# Patient Record
Sex: FEMALE | Race: WHITE | NOT HISPANIC OR LATINO | URBAN - METROPOLITAN AREA
[De-identification: names, ages, dates, MRNs, and addresses within clinical notes are randomized per-mention and may not be internally consistent; named-entity substitution may affect disease eponyms.]

---

## 2017-04-10 ENCOUNTER — EMERGENCY (EMERGENCY)
Facility: HOSPITAL | Age: 32
LOS: 1 days | Discharge: HOME | End: 2017-04-10
Attending: EMERGENCY MEDICINE | Admitting: EMERGENCY MEDICINE
Payer: COMMERCIAL

## 2017-04-10 VITALS
DIASTOLIC BLOOD PRESSURE: 86 MMHG | HEIGHT: 64 IN | WEIGHT: 130.07 LBS | RESPIRATION RATE: 16 BRPM | OXYGEN SATURATION: 100 % | SYSTOLIC BLOOD PRESSURE: 135 MMHG | TEMPERATURE: 98 F | HEART RATE: 90 BPM

## 2017-04-10 DIAGNOSIS — G40.909 EPILEPSY, UNSPECIFIED, NOT INTRACTABLE, WITHOUT STATUS EPILEPTICUS: ICD-10-CM

## 2017-04-10 DIAGNOSIS — R10.30 LOWER ABDOMINAL PAIN, UNSPECIFIED: ICD-10-CM

## 2017-04-10 DIAGNOSIS — Z91.040 LATEX ALLERGY STATUS: ICD-10-CM

## 2017-04-10 DIAGNOSIS — N83.201 UNSPECIFIED OVARIAN CYST, RIGHT SIDE: ICD-10-CM

## 2017-04-10 LAB
ALBUMIN SERPL ELPH-MCNC: 4.5 G/DL — SIGNIFICANT CHANGE UP (ref 3.3–5)
ALP SERPL-CCNC: 91 U/L — SIGNIFICANT CHANGE UP (ref 40–120)
ALT FLD-CCNC: 29 U/L — SIGNIFICANT CHANGE UP (ref 12–78)
ANION GAP SERPL CALC-SCNC: 9 MMOL/L — SIGNIFICANT CHANGE UP (ref 5–17)
APPEARANCE UR: CLEAR — SIGNIFICANT CHANGE UP
AST SERPL-CCNC: 20 U/L — SIGNIFICANT CHANGE UP (ref 15–37)
BASOPHILS # BLD AUTO: 0.1 K/UL — SIGNIFICANT CHANGE UP (ref 0–0.2)
BASOPHILS NFR BLD AUTO: 1.1 % — SIGNIFICANT CHANGE UP (ref 0–2)
BILIRUB SERPL-MCNC: 0.2 MG/DL — SIGNIFICANT CHANGE UP (ref 0.2–1.2)
BILIRUB UR-MCNC: NEGATIVE — SIGNIFICANT CHANGE UP
BUN SERPL-MCNC: 22 MG/DL — SIGNIFICANT CHANGE UP (ref 7–23)
CALCIUM SERPL-MCNC: 9 MG/DL — SIGNIFICANT CHANGE UP (ref 8.5–10.1)
CHLORIDE SERPL-SCNC: 107 MMOL/L — SIGNIFICANT CHANGE UP (ref 96–108)
CO2 SERPL-SCNC: 22 MMOL/L — SIGNIFICANT CHANGE UP (ref 22–31)
COLOR SPEC: YELLOW — SIGNIFICANT CHANGE UP
CREAT SERPL-MCNC: 0.76 MG/DL — SIGNIFICANT CHANGE UP (ref 0.5–1.3)
DIFF PNL FLD: NEGATIVE — SIGNIFICANT CHANGE UP
EOSINOPHIL # BLD AUTO: 0.2 K/UL — SIGNIFICANT CHANGE UP (ref 0–0.5)
EOSINOPHIL NFR BLD AUTO: 2 % — SIGNIFICANT CHANGE UP (ref 0–6)
GLUCOSE SERPL-MCNC: 92 MG/DL — SIGNIFICANT CHANGE UP (ref 70–99)
GLUCOSE UR QL: NEGATIVE — SIGNIFICANT CHANGE UP
HCG SERPL-ACNC: <1 MIU/ML — SIGNIFICANT CHANGE UP
HCT VFR BLD CALC: 39.4 % — SIGNIFICANT CHANGE UP (ref 34.5–45)
HGB BLD-MCNC: 12.6 G/DL — SIGNIFICANT CHANGE UP (ref 11.5–15.5)
KETONES UR-MCNC: NEGATIVE — SIGNIFICANT CHANGE UP
LEUKOCYTE ESTERASE UR-ACNC: NEGATIVE — SIGNIFICANT CHANGE UP
LIDOCAIN IGE QN: 161 U/L — SIGNIFICANT CHANGE UP (ref 73–393)
LYMPHOCYTES # BLD AUTO: 2.6 K/UL — SIGNIFICANT CHANGE UP (ref 1–3.3)
LYMPHOCYTES # BLD AUTO: 33.7 % — SIGNIFICANT CHANGE UP (ref 13–44)
MCHC RBC-ENTMCNC: 28.7 PG — SIGNIFICANT CHANGE UP (ref 27–34)
MCHC RBC-ENTMCNC: 32 GM/DL — SIGNIFICANT CHANGE UP (ref 32–36)
MCV RBC AUTO: 89.6 FL — SIGNIFICANT CHANGE UP (ref 80–100)
MONOCYTES # BLD AUTO: 0.6 K/UL — SIGNIFICANT CHANGE UP (ref 0–0.9)
MONOCYTES NFR BLD AUTO: 7.5 % — SIGNIFICANT CHANGE UP (ref 1–9)
NEUTROPHILS # BLD AUTO: 4.3 K/UL — SIGNIFICANT CHANGE UP (ref 1.8–7.4)
NEUTROPHILS NFR BLD AUTO: 55.7 % — SIGNIFICANT CHANGE UP (ref 43–77)
NITRITE UR-MCNC: NEGATIVE — SIGNIFICANT CHANGE UP
PH UR: 6.5 — SIGNIFICANT CHANGE UP (ref 4.8–8)
PLATELET # BLD AUTO: 225 K/UL — SIGNIFICANT CHANGE UP (ref 150–400)
POTASSIUM SERPL-MCNC: 3.8 MMOL/L — SIGNIFICANT CHANGE UP (ref 3.5–5.3)
POTASSIUM SERPL-SCNC: 3.8 MMOL/L — SIGNIFICANT CHANGE UP (ref 3.5–5.3)
PROT SERPL-MCNC: 7.7 G/DL — SIGNIFICANT CHANGE UP (ref 6–8.3)
PROT UR-MCNC: NEGATIVE — SIGNIFICANT CHANGE UP
RBC # BLD: 4.39 M/UL — SIGNIFICANT CHANGE UP (ref 3.8–5.2)
RBC # FLD: 12.7 % — SIGNIFICANT CHANGE UP (ref 10.3–14.5)
SODIUM SERPL-SCNC: 138 MMOL/L — SIGNIFICANT CHANGE UP (ref 135–145)
SP GR SPEC: 1 — LOW (ref 1.01–1.02)
UROBILINOGEN FLD QL: NEGATIVE — SIGNIFICANT CHANGE UP
WBC # BLD: 7.7 K/UL — SIGNIFICANT CHANGE UP (ref 3.8–10.5)
WBC # FLD AUTO: 7.7 K/UL — SIGNIFICANT CHANGE UP (ref 3.8–10.5)

## 2017-04-10 PROCEDURE — 99284 EMERGENCY DEPT VISIT MOD MDM: CPT | Mod: 25

## 2017-04-10 PROCEDURE — 76830 TRANSVAGINAL US NON-OB: CPT

## 2017-04-10 PROCEDURE — 81003 URINALYSIS AUTO W/O SCOPE: CPT

## 2017-04-10 PROCEDURE — 80053 COMPREHEN METABOLIC PANEL: CPT

## 2017-04-10 PROCEDURE — 84702 CHORIONIC GONADOTROPIN TEST: CPT

## 2017-04-10 PROCEDURE — 99285 EMERGENCY DEPT VISIT HI MDM: CPT

## 2017-04-10 PROCEDURE — 76830 TRANSVAGINAL US NON-OB: CPT | Mod: 26

## 2017-04-10 PROCEDURE — 83690 ASSAY OF LIPASE: CPT

## 2017-04-10 PROCEDURE — 85027 COMPLETE CBC AUTOMATED: CPT

## 2017-04-10 RX ORDER — SODIUM CHLORIDE 9 MG/ML
1000 INJECTION INTRAMUSCULAR; INTRAVENOUS; SUBCUTANEOUS
Qty: 0 | Refills: 0 | Status: DISCONTINUED | OUTPATIENT
Start: 2017-04-10 | End: 2017-04-14

## 2017-04-10 RX ORDER — IOHEXOL 300 MG/ML
30 INJECTION, SOLUTION INTRAVENOUS ONCE
Qty: 0 | Refills: 0 | Status: COMPLETED | OUTPATIENT
Start: 2017-04-10 | End: 2017-04-10

## 2017-04-10 RX ADMIN — IOHEXOL 30 MILLILITER(S): 300 INJECTION, SOLUTION INTRAVENOUS at 21:30

## 2017-04-10 RX ADMIN — SODIUM CHLORIDE 2000 MILLILITER(S): 9 INJECTION INTRAMUSCULAR; INTRAVENOUS; SUBCUTANEOUS at 21:30

## 2017-04-10 NOTE — ED PROVIDER NOTE - MEDICAL DECISION MAKING DETAILS
pt is 30 yo female with no sign hx. pt with 2 days lower abd pain, check labs, preg, sono, ct if neg.

## 2017-04-10 NOTE — ED PROVIDER NOTE - CHPI ED SYMPTOMS NEG
no abdominal distension/no burning urination/no vomiting/no chills/no dysuria/no fever/no diarrhea/no nausea

## 2017-04-10 NOTE — ED ADULT TRIAGE NOTE - CHIEF COMPLAINT QUOTE
patient c/o b/l lower abdominal pain since Saturday. patient denies fever, chills, nausea, vomiting, diarrhea, hematuria, dysuria.

## 2017-04-10 NOTE — ED PROVIDER NOTE - OBJECTIVE STATEMENT
Pt is a 33 yo female flu one week ago. pt took tamiflu. pt now with 2 days lower abd pain, left more than right, no f/c, n/v/d. pt denies d/c radiation or other sx. no vag d/c or bleeding. pain increased on bumps on car ride to er.

## 2017-04-11 VITALS
SYSTOLIC BLOOD PRESSURE: 131 MMHG | OXYGEN SATURATION: 100 % | DIASTOLIC BLOOD PRESSURE: 74 MMHG | RESPIRATION RATE: 15 BRPM | TEMPERATURE: 98 F | HEART RATE: 79 BPM

## 2017-07-20 ENCOUNTER — TRANSCRIPTION ENCOUNTER (OUTPATIENT)
Age: 32
End: 2017-07-20

## 2017-09-11 ENCOUNTER — TRANSCRIPTION ENCOUNTER (OUTPATIENT)
Age: 32
End: 2017-09-11

## 2017-09-28 ENCOUNTER — OUTPATIENT (OUTPATIENT)
Dept: OUTPATIENT SERVICES | Facility: HOSPITAL | Age: 32
LOS: 1 days | End: 2017-09-28
Payer: COMMERCIAL

## 2017-09-28 VITALS
DIASTOLIC BLOOD PRESSURE: 80 MMHG | HEART RATE: 76 BPM | OXYGEN SATURATION: 97 % | HEIGHT: 64 IN | TEMPERATURE: 100 F | RESPIRATION RATE: 12 BRPM | SYSTOLIC BLOOD PRESSURE: 114 MMHG | WEIGHT: 130.95 LBS

## 2017-09-28 DIAGNOSIS — N83.202 UNSPECIFIED OVARIAN CYST, LEFT SIDE: ICD-10-CM

## 2017-09-28 DIAGNOSIS — N83.201 UNSPECIFIED OVARIAN CYST, RIGHT SIDE: ICD-10-CM

## 2017-09-28 DIAGNOSIS — Z01.818 ENCOUNTER FOR OTHER PREPROCEDURAL EXAMINATION: ICD-10-CM

## 2017-09-28 PROCEDURE — G0463: CPT

## 2017-09-28 PROCEDURE — 85027 COMPLETE CBC AUTOMATED: CPT

## 2017-09-28 RX ORDER — SODIUM CHLORIDE 9 MG/ML
3 INJECTION INTRAMUSCULAR; INTRAVENOUS; SUBCUTANEOUS EVERY 8 HOURS
Qty: 0 | Refills: 0 | Status: DISCONTINUED | OUTPATIENT
Start: 2017-10-05 | End: 2017-10-20

## 2017-09-28 RX ORDER — LIDOCAINE HCL 20 MG/ML
0.2 VIAL (ML) INJECTION ONCE
Qty: 0 | Refills: 0 | Status: DISCONTINUED | OUTPATIENT
Start: 2017-10-05 | End: 2017-10-20

## 2017-09-28 RX ORDER — ACETAMINOPHEN 500 MG
975 TABLET ORAL ONCE
Qty: 0 | Refills: 0 | Status: COMPLETED | OUTPATIENT
Start: 2017-10-05 | End: 2017-10-05

## 2017-09-28 NOTE — H&P PST ADULT - NSANTHOSAYNRD_GEN_A_CORE
No. RONNI screening performed.  STOP BANG Legend: 0-2 = LOW Risk; 3-4 = INTERMEDIATE Risk; 5-8 = HIGH Risk

## 2017-10-04 ENCOUNTER — TRANSCRIPTION ENCOUNTER (OUTPATIENT)
Age: 32
End: 2017-10-04

## 2017-10-05 ENCOUNTER — OUTPATIENT (OUTPATIENT)
Dept: OUTPATIENT SERVICES | Facility: HOSPITAL | Age: 32
LOS: 1 days | End: 2017-10-05
Payer: COMMERCIAL

## 2017-10-05 ENCOUNTER — RESULT REVIEW (OUTPATIENT)
Age: 32
End: 2017-10-05

## 2017-10-05 VITALS
RESPIRATION RATE: 12 BRPM | DIASTOLIC BLOOD PRESSURE: 82 MMHG | WEIGHT: 130.95 LBS | OXYGEN SATURATION: 100 % | SYSTOLIC BLOOD PRESSURE: 132 MMHG | TEMPERATURE: 98 F | HEART RATE: 78 BPM | HEIGHT: 64 IN

## 2017-10-05 VITALS
OXYGEN SATURATION: 99 % | RESPIRATION RATE: 18 BRPM | SYSTOLIC BLOOD PRESSURE: 119 MMHG | DIASTOLIC BLOOD PRESSURE: 67 MMHG | HEART RATE: 87 BPM

## 2017-10-05 DIAGNOSIS — N83.201 UNSPECIFIED OVARIAN CYST, RIGHT SIDE: ICD-10-CM

## 2017-10-05 PROCEDURE — C1889: CPT

## 2017-10-05 PROCEDURE — 88305 TISSUE EXAM BY PATHOLOGIST: CPT

## 2017-10-05 PROCEDURE — 88305 TISSUE EXAM BY PATHOLOGIST: CPT | Mod: 26

## 2017-10-05 PROCEDURE — 58662 LAPAROSCOPY EXCISE LESIONS: CPT

## 2017-10-05 RX ORDER — ONDANSETRON 8 MG/1
4 TABLET, FILM COATED ORAL ONCE
Qty: 0 | Refills: 0 | Status: COMPLETED | OUTPATIENT
Start: 2017-10-05 | End: 2017-10-05

## 2017-10-05 RX ORDER — DIPHENHYDRAMINE HCL 50 MG
25 CAPSULE ORAL ONCE
Qty: 0 | Refills: 0 | Status: COMPLETED | OUTPATIENT
Start: 2017-10-05 | End: 2017-10-05

## 2017-10-05 RX ORDER — CELECOXIB 200 MG/1
200 CAPSULE ORAL ONCE
Qty: 0 | Refills: 0 | Status: COMPLETED | OUTPATIENT
Start: 2017-10-05 | End: 2017-10-05

## 2017-10-05 RX ORDER — SODIUM CHLORIDE 9 MG/ML
1000 INJECTION, SOLUTION INTRAVENOUS
Qty: 0 | Refills: 0 | Status: DISCONTINUED | OUTPATIENT
Start: 2017-10-05 | End: 2017-10-20

## 2017-10-05 RX ORDER — OXYCODONE HYDROCHLORIDE 5 MG/1
10 TABLET ORAL ONCE
Qty: 0 | Refills: 0 | Status: DISCONTINUED | OUTPATIENT
Start: 2017-10-05 | End: 2017-10-05

## 2017-10-05 RX ORDER — OXYCODONE HYDROCHLORIDE 5 MG/1
1 TABLET ORAL
Qty: 20 | Refills: 0
Start: 2017-10-05

## 2017-10-05 RX ORDER — DEXAMETHASONE 0.5 MG/5ML
4 ELIXIR ORAL ONCE
Qty: 0 | Refills: 0 | Status: COMPLETED | OUTPATIENT
Start: 2017-10-05 | End: 2017-10-05

## 2017-10-05 RX ORDER — HYDROMORPHONE HYDROCHLORIDE 2 MG/ML
0.25 INJECTION INTRAMUSCULAR; INTRAVENOUS; SUBCUTANEOUS
Qty: 0 | Refills: 0 | Status: DISCONTINUED | OUTPATIENT
Start: 2017-10-05 | End: 2017-10-05

## 2017-10-05 RX ADMIN — CELECOXIB 200 MILLIGRAM(S): 200 CAPSULE ORAL at 07:43

## 2017-10-05 RX ADMIN — OXYCODONE HYDROCHLORIDE 10 MILLIGRAM(S): 5 TABLET ORAL at 10:22

## 2017-10-05 RX ADMIN — CELECOXIB 200 MILLIGRAM(S): 200 CAPSULE ORAL at 10:22

## 2017-10-05 RX ADMIN — Medication 975 MILLIGRAM(S): at 07:43

## 2017-10-05 RX ADMIN — HYDROMORPHONE HYDROCHLORIDE 0.25 MILLIGRAM(S): 2 INJECTION INTRAMUSCULAR; INTRAVENOUS; SUBCUTANEOUS at 10:22

## 2017-10-05 RX ADMIN — Medication 4 MILLIGRAM(S): at 11:45

## 2017-10-05 RX ADMIN — ONDANSETRON 4 MILLIGRAM(S): 8 TABLET, FILM COATED ORAL at 10:24

## 2017-10-05 RX ADMIN — Medication 25 MILLIGRAM(S): at 11:45

## 2017-10-05 NOTE — ASU DISCHARGE PLAN (ADULT/PEDIATRIC). - NOTIFY
Excessive Diarrhea/Swelling that continues/GYN Fever>100.4/Numbness, tingling/Inability to Tolerate Liquids or Foods/Numbness, color, or temperature change to extremity/Unable to Urinate/Bleeding that does not stop/Pain not relieved by Medications/Persistent Nausea and Vomiting/Increased Irritability or Sluggishness

## 2017-10-05 NOTE — ASU DISCHARGE PLAN (ADULT/PEDIATRIC). - MEDICATION SUMMARY - MEDICATIONS TO TAKE
I will START or STAY ON the medications listed below when I get home from the hospital:    oxyCODONE 5 mg oral tablet  -- 1 tab(s) by mouth every 6 hours, As Needed MDD:4 tabs  -- Caution federal law prohibits the transfer of this drug to any person other  than the person for whom it was prescribed.  It is very important that you take or use this exactly as directed.  Do not skip doses or discontinue unless directed by your doctor.  May cause drowsiness.  Alcohol may intensify this effect.  Use care when operating dangerous machinery.  This prescription cannot be refilled.  Using more of this medication than prescribed may cause serious breathing problems.    -- Indication: For pain    Motrin 600 mg oral tablet  -- 1 tab(s) by mouth every 6 hours  -- Indication: For home med    famotidine 20 mg oral tablet  -- orally once a day pm and am of [place]  -- Indication: For home med

## 2017-10-10 LAB — SURGICAL PATHOLOGY STUDY: SIGNIFICANT CHANGE UP

## 2018-05-17 ENCOUNTER — RESULT REVIEW (OUTPATIENT)
Age: 33
End: 2018-05-17

## 2018-11-26 ENCOUNTER — TRANSCRIPTION ENCOUNTER (OUTPATIENT)
Age: 33
End: 2018-11-26

## 2018-12-22 ENCOUNTER — TRANSCRIPTION ENCOUNTER (OUTPATIENT)
Age: 33
End: 2018-12-22

## 2018-12-25 ENCOUNTER — TRANSCRIPTION ENCOUNTER (OUTPATIENT)
Age: 33
End: 2018-12-25

## 2019-03-01 PROBLEM — Z00.00 ENCOUNTER FOR PREVENTIVE HEALTH EXAMINATION: Status: ACTIVE | Noted: 2019-03-01

## 2019-03-21 ENCOUNTER — APPOINTMENT (OUTPATIENT)
Dept: HUMAN REPRODUCTION | Facility: CLINIC | Age: 34
End: 2019-03-21

## 2020-05-26 ENCOUNTER — TRANSCRIPTION ENCOUNTER (OUTPATIENT)
Age: 35
End: 2020-05-26

## 2020-07-24 ENCOUNTER — RESULT REVIEW (OUTPATIENT)
Age: 35
End: 2020-07-24

## 2020-08-06 ENCOUNTER — TRANSCRIPTION ENCOUNTER (OUTPATIENT)
Age: 35
End: 2020-08-06

## 2020-09-10 ENCOUNTER — APPOINTMENT (OUTPATIENT)
Dept: ANTEPARTUM | Facility: CLINIC | Age: 35
End: 2020-09-10
Payer: COMMERCIAL

## 2020-09-10 ENCOUNTER — ASOB RESULT (OUTPATIENT)
Age: 35
End: 2020-09-10

## 2020-09-10 ENCOUNTER — TRANSCRIPTION ENCOUNTER (OUTPATIENT)
Age: 35
End: 2020-09-10

## 2020-09-10 PROCEDURE — 76805 OB US >/= 14 WKS SNGL FETUS: CPT

## 2020-09-10 PROCEDURE — 99201 OFFICE OUTPATIENT NEW 10 MINUTES: CPT | Mod: 25

## 2020-10-09 ENCOUNTER — APPOINTMENT (OUTPATIENT)
Dept: ANTEPARTUM | Facility: CLINIC | Age: 35
End: 2020-10-09
Payer: COMMERCIAL

## 2020-10-09 ENCOUNTER — ASOB RESULT (OUTPATIENT)
Age: 35
End: 2020-10-09

## 2020-10-09 PROCEDURE — 76817 TRANSVAGINAL US OBSTETRIC: CPT

## 2020-10-09 PROCEDURE — 99212 OFFICE O/P EST SF 10 MIN: CPT | Mod: 25

## 2020-10-09 PROCEDURE — 76811 OB US DETAILED SNGL FETUS: CPT

## 2020-10-19 ENCOUNTER — APPOINTMENT (OUTPATIENT)
Dept: ANTEPARTUM | Facility: CLINIC | Age: 35
End: 2020-10-19
Payer: COMMERCIAL

## 2020-10-19 ENCOUNTER — ASOB RESULT (OUTPATIENT)
Age: 35
End: 2020-10-19

## 2020-10-19 PROCEDURE — 99072 ADDL SUPL MATRL&STAF TM PHE: CPT

## 2020-10-19 PROCEDURE — 93325 DOPPLER ECHO COLOR FLOW MAPG: CPT

## 2020-10-19 PROCEDURE — 76827 ECHO EXAM OF FETAL HEART: CPT

## 2020-10-19 PROCEDURE — 76825 ECHO EXAM OF FETAL HEART: CPT

## 2020-11-20 ENCOUNTER — ASOB RESULT (OUTPATIENT)
Age: 35
End: 2020-11-20

## 2020-11-20 ENCOUNTER — APPOINTMENT (OUTPATIENT)
Dept: ANTEPARTUM | Facility: CLINIC | Age: 35
End: 2020-11-20
Payer: COMMERCIAL

## 2020-11-20 PROCEDURE — 76816 OB US FOLLOW-UP PER FETUS: CPT

## 2020-12-01 NOTE — PACU DISCHARGE NOTE - NAUSEA/VOMITING:
None Area H Indication Text: Tumors in this location are included in Area H (eyelids, eyebrows, nose, lips, chin, ear, pre-auricular, post-auricular, temple, genitalia, hands, feet, ankles and areola).  Tissue conservation is critical in these anatomic locations.

## 2020-12-04 ENCOUNTER — TRANSCRIPTION ENCOUNTER (OUTPATIENT)
Age: 35
End: 2020-12-04

## 2020-12-20 ENCOUNTER — TRANSCRIPTION ENCOUNTER (OUTPATIENT)
Age: 35
End: 2020-12-20

## 2021-01-13 ENCOUNTER — OUTPATIENT (OUTPATIENT)
Dept: OUTPATIENT SERVICES | Facility: HOSPITAL | Age: 36
LOS: 1 days | End: 2021-01-13
Payer: COMMERCIAL

## 2021-01-13 VITALS
SYSTOLIC BLOOD PRESSURE: 125 MMHG | TEMPERATURE: 99 F | RESPIRATION RATE: 16 BRPM | HEART RATE: 100 BPM | DIASTOLIC BLOOD PRESSURE: 71 MMHG

## 2021-01-13 VITALS — OXYGEN SATURATION: 97 % | HEART RATE: 102 BPM

## 2021-01-13 DIAGNOSIS — Z3A.00 WEEKS OF GESTATION OF PREGNANCY NOT SPECIFIED: ICD-10-CM

## 2021-01-13 DIAGNOSIS — O26.899 OTHER SPECIFIED PREGNANCY RELATED CONDITIONS, UNSPECIFIED TRIMESTER: ICD-10-CM

## 2021-01-13 DIAGNOSIS — Z98.890 OTHER SPECIFIED POSTPROCEDURAL STATES: Chronic | ICD-10-CM

## 2021-01-13 LAB
APPEARANCE UR: CLEAR — SIGNIFICANT CHANGE UP
BILIRUB UR-MCNC: NEGATIVE — SIGNIFICANT CHANGE UP
COLOR SPEC: COLORLESS — SIGNIFICANT CHANGE UP
DIFF PNL FLD: NEGATIVE — SIGNIFICANT CHANGE UP
GLUCOSE UR QL: NEGATIVE — SIGNIFICANT CHANGE UP
KETONES UR-MCNC: NEGATIVE — SIGNIFICANT CHANGE UP
LEUKOCYTE ESTERASE UR-ACNC: NEGATIVE — SIGNIFICANT CHANGE UP
NITRITE UR-MCNC: NEGATIVE — SIGNIFICANT CHANGE UP
PH UR: 7 — SIGNIFICANT CHANGE UP (ref 5–8)
PROT UR-MCNC: NEGATIVE — SIGNIFICANT CHANGE UP
SP GR SPEC: 1.01 — LOW (ref 1.01–1.02)
UROBILINOGEN FLD QL: NEGATIVE — SIGNIFICANT CHANGE UP

## 2021-01-13 PROCEDURE — 81003 URINALYSIS AUTO W/O SCOPE: CPT

## 2021-01-13 PROCEDURE — 59025 FETAL NON-STRESS TEST: CPT

## 2021-01-13 PROCEDURE — G0463: CPT

## 2021-01-13 PROCEDURE — 87086 URINE CULTURE/COLONY COUNT: CPT

## 2021-01-13 RX ORDER — ACETAMINOPHEN 500 MG
650 TABLET ORAL ONCE
Refills: 0 | Status: COMPLETED | OUTPATIENT
Start: 2021-01-13 | End: 2021-01-13

## 2021-01-13 RX ORDER — CYCLOBENZAPRINE HYDROCHLORIDE 10 MG/1
10 TABLET, FILM COATED ORAL ONCE
Refills: 0 | Status: COMPLETED | OUTPATIENT
Start: 2021-01-13 | End: 2021-01-13

## 2021-01-13 RX ADMIN — Medication 650 MILLIGRAM(S): at 18:09

## 2021-01-13 RX ADMIN — CYCLOBENZAPRINE HYDROCHLORIDE 10 MILLIGRAM(S): 10 TABLET, FILM COATED ORAL at 18:09

## 2021-01-13 NOTE — OB PROVIDER TRIAGE NOTE - NSOBPROVIDERNOTE_OBGYN_ALL_OB_FT
34y/o  @ 33w6d GA, presents c/o LLQ cramping and associated lower back pain x1 day. VSS. Cervix closed on exam, irreg contractions. Low suspicion for PTL. Differential includes UTI, musculoskeletal pain, less likely nephrolithiasis.    -Send UA/Ucx  -Repeat SVE in 2 hours  -Continous EFM/[place]     D/w Dr. Lela Ulloa PGY-3 36y/o  @ 33w6d GA, presents c/o LLQ cramping and associated lower back pain x1 day. VSS. Cervix closed on exam, irreg contractions. Low suspicion for PTL. Differential includes UTI, musculoskeletal pain, less likely nephrolithiasis.    -Send UA/Ucx  -Repeat SVE in 2 hours  -Continous EFM/[place]     D/w Dr. Lela Ulloa PGY-3      OB attending  pt reports pain that started on her back and "couldn't move last night" - only called today about back pain that was relieved by tylenol but not completely and now still 5/10 on left side, worse with movement  pain localized to one area  no pain on SVE - L/C/P at 5:30pm as well  mod variability +  qaccels no current decels, rare ctx  unlikely  labor  UA without significant findings  could be muscoskeletal - will try tylenol with flexeril, If pain not improved consider additional imaging

## 2021-01-13 NOTE — CHART NOTE - NSCHARTNOTEFT_GEN_A_CORE
followed up with patient  pain down to 3  feeling better  fetal status remains reassuring  denies ctx  will d/c home and f/u as needed  strict precautions given for ptl or  sx  instructed to call office Friday for Ucx results  cont home tylenol and flexeril as needed

## 2021-01-13 NOTE — OB PROVIDER TRIAGE NOTE - NSHPPHYSICALEXAM_GEN_ALL_CORE
T(C): 37.1 (01-13-21 @ 14:29), Max: 37.1 (01-13-21 @ 14:23)  HR: 79 (01-13-21 @ 15:10) (79 - 100)  BP: 125/71 (01-13-21 @ 14:29) (125/71 - 125/71)  RR: 16 (01-13-21 @ 14:29) (16 - 16)  SpO2: 97% (01-13-21 @ 15:10) (97% - 98%)    Gen: NAD  Abd: soft, gravid, mild tenderness over LLQ, no rebound or guarding  Back: No CVA tenderness  TAUS: breech, FHR+, +fetal movement  SVE: closed  EFM: 130/mod/+acels/+1 decel while lying flat for exam  [employer]: irreg ctx

## 2021-01-13 NOTE — OB PROVIDER TRIAGE NOTE - HISTORY OF PRESENT ILLNESS
36y/o  @ 33w6d GA, presents c/o LLQ cramping and associated lower back pain x1 day. Patient reports feeling      Endorses good FM. Denies LOF/VB/frequent Ctx. GBS Neg. EFW      EFW  GBS  PNC    OBHx:   GYNHx:   PMH:   PSH:  Meds:  All:  Soc: No EtOH, tobacco, illicit drug use in pregnancy  Psych:   FHx: No bleeding/clotting disorders in pregnancy    T(C): 37.1 (21 @ 14:29), Max: 37.1 (21 @ 14:23)  HR: 79 (21 @ 15:10) (79 - 100)  BP: 125/71 (21 @ 14:29) (125/71 - 125/71)  RR: 16 (21 @ 14:29) (16 - 16)  SpO2: 97% (21 @ 15:10) (97% - 98%)  Sono: Vtx  VE:  EFM: FHR  [place]: Ctx Qmin      A/P: Pt is a 35y GP @ wks presenting for.  -Admit to L&D, Routine labs, IVF  -IOL With:   -EFM + [place]    D/w Dr. Lupe Ulloa PGY- 34y/o  @ 33w6d GA, presents c/o LLQ cramping and associated lower back pain x1 day. Patient reports the cramping improved temporarily overnight, however returned this AM. She took 500mg of Tylenol with some improvement. Last dose 12:30p. Patient states when the patient was at its worse it      Endorses good FM. Denies LOF/VB/frequent Ctx. GBS Neg. EFW      EFW  GBS  PNC    OBHx:   GYNHx:   PMH:   PSH:  Meds:  All:  Soc: No EtOH, tobacco, illicit drug use in pregnancy  Psych:   FHx: No bleeding/clotting disorders in pregnancy    T(C): 37.1 (21 @ 14:29), Max: 37.1 (21 @ 14:23)  HR: 79 (21 @ 15:10) (79 - 100)  BP: 125/71 (21 @ 14:29) (125/71 - 125/71)  RR: 16 (21 @ 14:29) (16 - 16)  SpO2: 97% (21 @ 15:10) (97% - 98%)  Sono: Vtx  VE:  EFM: FHR  [place]: Ctx Qmin      A/P: Pt is a 35y GP @ wks presenting for.  -Admit to L&D, Routine labs, IVF  -IOL With:   -EFM + [place]    D/w Dr. Lupe Ulloa PGY- 36y/o  @ 33w6d GA, presents c/o LLQ cramping and associated lower back pain x1 day. Patient reports the cramping improved temporarily overnight, however returned this AM. She took 500mg of Tylenol with some improvement. Last dose 12:30p. Patient states when the pain was at its worse it was difficult to ambulate. She is now ambulating without difficulty and pain is rated 5/10 in severity. She stands for 12hr per day at work and feels she has been very active recently. Denies recent trauma. Denies n/v, fevers/chills, changes in bowel movement, dysuria, CP/SOB. Denies VB, LOF, ctx. Endorses good FM. She is scheduled for a  21, which she said is due to breech, but that she will proceed with the  regardless if the fetus turns vertex.    PNC significant for a subchorionic hematoma diagnosed first trimester.    EFW unknown  GBS unknown     OBH: curernt   GYNH: h/o endometrioma status post LSC R. 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

## 2021-01-15 LAB
CULTURE RESULTS: NO GROWTH — SIGNIFICANT CHANGE UP
SPECIMEN SOURCE: SIGNIFICANT CHANGE UP

## 2021-01-28 ENCOUNTER — NON-APPOINTMENT (OUTPATIENT)
Age: 36
End: 2021-01-28

## 2021-02-12 ENCOUNTER — OUTPATIENT (OUTPATIENT)
Dept: OUTPATIENT SERVICES | Facility: HOSPITAL | Age: 36
LOS: 1 days | End: 2021-02-12
Payer: COMMERCIAL

## 2021-02-12 VITALS
RESPIRATION RATE: 16 BRPM | HEART RATE: 90 BPM | DIASTOLIC BLOOD PRESSURE: 80 MMHG | TEMPERATURE: 99 F | WEIGHT: 160.94 LBS | OXYGEN SATURATION: 98 % | SYSTOLIC BLOOD PRESSURE: 128 MMHG | HEIGHT: 64 IN

## 2021-02-12 VITALS — SYSTOLIC BLOOD PRESSURE: 116 MMHG | HEART RATE: 99 BPM | DIASTOLIC BLOOD PRESSURE: 71 MMHG | OXYGEN SATURATION: 97 %

## 2021-02-12 VITALS — OXYGEN SATURATION: 91 % | HEART RATE: 80 BPM

## 2021-02-12 DIAGNOSIS — Z3A.00 WEEKS OF GESTATION OF PREGNANCY NOT SPECIFIED: ICD-10-CM

## 2021-02-12 DIAGNOSIS — Z91.040 LATEX ALLERGY STATUS: ICD-10-CM

## 2021-02-12 DIAGNOSIS — Z98.890 OTHER SPECIFIED POSTPROCEDURAL STATES: Chronic | ICD-10-CM

## 2021-02-12 DIAGNOSIS — Z01.818 ENCOUNTER FOR OTHER PREPROCEDURAL EXAMINATION: ICD-10-CM

## 2021-02-12 DIAGNOSIS — O26.899 OTHER SPECIFIED PREGNANCY RELATED CONDITIONS, UNSPECIFIED TRIMESTER: ICD-10-CM

## 2021-02-12 LAB
ALBUMIN SERPL ELPH-MCNC: 3.4 G/DL — SIGNIFICANT CHANGE UP (ref 3.3–5)
ALP SERPL-CCNC: 121 U/L — HIGH (ref 40–120)
ALT FLD-CCNC: 8 U/L — LOW (ref 10–45)
ANION GAP SERPL CALC-SCNC: 7 MMOL/L — SIGNIFICANT CHANGE UP (ref 5–17)
APPEARANCE UR: ABNORMAL
APTT BLD: 24.7 SEC — LOW (ref 27.5–35.5)
AST SERPL-CCNC: 14 U/L — SIGNIFICANT CHANGE UP (ref 10–40)
BASOPHILS # BLD AUTO: 0.01 K/UL — SIGNIFICANT CHANGE UP (ref 0–0.2)
BASOPHILS NFR BLD AUTO: 0.1 % — SIGNIFICANT CHANGE UP (ref 0–2)
BILIRUB SERPL-MCNC: 0.3 MG/DL — SIGNIFICANT CHANGE UP (ref 0.2–1.2)
BILIRUB UR-MCNC: NEGATIVE — SIGNIFICANT CHANGE UP
BLD GP AB SCN SERPL QL: NEGATIVE — SIGNIFICANT CHANGE UP
BUN SERPL-MCNC: 9 MG/DL — SIGNIFICANT CHANGE UP (ref 7–23)
CALCIUM SERPL-MCNC: 9 MG/DL — SIGNIFICANT CHANGE UP (ref 8.4–10.5)
CHLORIDE SERPL-SCNC: 105 MMOL/L — SIGNIFICANT CHANGE UP (ref 96–108)
CO2 SERPL-SCNC: 25 MMOL/L — SIGNIFICANT CHANGE UP (ref 22–31)
COLOR SPEC: YELLOW — SIGNIFICANT CHANGE UP
CREAT ?TM UR-MCNC: 176 MG/DL — SIGNIFICANT CHANGE UP
CREAT SERPL-MCNC: 0.69 MG/DL — SIGNIFICANT CHANGE UP (ref 0.5–1.3)
DIFF PNL FLD: NEGATIVE — SIGNIFICANT CHANGE UP
EOSINOPHIL # BLD AUTO: 0.05 K/UL — SIGNIFICANT CHANGE UP (ref 0–0.5)
EOSINOPHIL NFR BLD AUTO: 0.7 % — SIGNIFICANT CHANGE UP (ref 0–6)
FIBRINOGEN PPP-MCNC: 682 MG/DL — HIGH (ref 290–520)
GLUCOSE SERPL-MCNC: 75 MG/DL — SIGNIFICANT CHANGE UP (ref 70–99)
GLUCOSE UR QL: NEGATIVE — SIGNIFICANT CHANGE UP
HCT VFR BLD CALC: 32.1 % — LOW (ref 34.5–45)
HGB BLD-MCNC: 10.3 G/DL — LOW (ref 11.5–15.5)
IMM GRANULOCYTES NFR BLD AUTO: 0.4 % — SIGNIFICANT CHANGE UP (ref 0–1.5)
INR BLD: 0.97 RATIO — SIGNIFICANT CHANGE UP (ref 0.88–1.16)
KETONES UR-MCNC: NEGATIVE — SIGNIFICANT CHANGE UP
LDH SERPL L TO P-CCNC: 168 U/L — SIGNIFICANT CHANGE UP (ref 50–242)
LEUKOCYTE ESTERASE UR-ACNC: NEGATIVE — SIGNIFICANT CHANGE UP
LYMPHOCYTES # BLD AUTO: 1.37 K/UL — SIGNIFICANT CHANGE UP (ref 1–3.3)
LYMPHOCYTES # BLD AUTO: 18 % — SIGNIFICANT CHANGE UP (ref 13–44)
MCHC RBC-ENTMCNC: 27.9 PG — SIGNIFICANT CHANGE UP (ref 27–34)
MCHC RBC-ENTMCNC: 32.1 GM/DL — SIGNIFICANT CHANGE UP (ref 32–36)
MCV RBC AUTO: 87 FL — SIGNIFICANT CHANGE UP (ref 80–100)
MONOCYTES # BLD AUTO: 0.59 K/UL — SIGNIFICANT CHANGE UP (ref 0–0.9)
MONOCYTES NFR BLD AUTO: 7.8 % — SIGNIFICANT CHANGE UP (ref 2–14)
NEUTROPHILS # BLD AUTO: 5.56 K/UL — SIGNIFICANT CHANGE UP (ref 1.8–7.4)
NEUTROPHILS NFR BLD AUTO: 73 % — SIGNIFICANT CHANGE UP (ref 43–77)
NITRITE UR-MCNC: NEGATIVE — SIGNIFICANT CHANGE UP
NRBC # BLD: 0 /100 WBCS — SIGNIFICANT CHANGE UP (ref 0–0)
PH UR: 6.5 — SIGNIFICANT CHANGE UP (ref 5–8)
PLATELET # BLD AUTO: 187 K/UL — SIGNIFICANT CHANGE UP (ref 150–400)
POTASSIUM SERPL-MCNC: 4.2 MMOL/L — SIGNIFICANT CHANGE UP (ref 3.5–5.3)
POTASSIUM SERPL-SCNC: 4.2 MMOL/L — SIGNIFICANT CHANGE UP (ref 3.5–5.3)
PROT ?TM UR-MCNC: 22 MG/DL — HIGH (ref 0–12)
PROT SERPL-MCNC: 6.4 G/DL — SIGNIFICANT CHANGE UP (ref 6–8.3)
PROT UR-MCNC: ABNORMAL
PROT/CREAT UR-RTO: 0.1 RATIO — SIGNIFICANT CHANGE UP (ref 0–0.2)
PROTHROM AB SERPL-ACNC: 11.6 SEC — SIGNIFICANT CHANGE UP (ref 10.6–13.6)
RBC # BLD: 3.69 M/UL — LOW (ref 3.8–5.2)
RBC # FLD: 13.4 % — SIGNIFICANT CHANGE UP (ref 10.3–14.5)
RH IG SCN BLD-IMP: POSITIVE — SIGNIFICANT CHANGE UP
SODIUM SERPL-SCNC: 137 MMOL/L — SIGNIFICANT CHANGE UP (ref 135–145)
SP GR SPEC: 1.02 — SIGNIFICANT CHANGE UP (ref 1.01–1.02)
URATE SERPL-MCNC: 3 MG/DL — SIGNIFICANT CHANGE UP (ref 2.5–7)
UROBILINOGEN FLD QL: NEGATIVE — SIGNIFICANT CHANGE UP
WBC # BLD: 7.61 K/UL — SIGNIFICANT CHANGE UP (ref 3.8–10.5)
WBC # FLD AUTO: 7.61 K/UL — SIGNIFICANT CHANGE UP (ref 3.8–10.5)

## 2021-02-12 PROCEDURE — 59025 FETAL NON-STRESS TEST: CPT | Mod: 26

## 2021-02-12 PROCEDURE — 85025 COMPLETE CBC W/AUTO DIFF WBC: CPT

## 2021-02-12 PROCEDURE — 86901 BLOOD TYPING SEROLOGIC RH(D): CPT

## 2021-02-12 PROCEDURE — 85610 PROTHROMBIN TIME: CPT

## 2021-02-12 PROCEDURE — 59025 FETAL NON-STRESS TEST: CPT | Mod: 26,77

## 2021-02-12 PROCEDURE — 83615 LACTATE (LD) (LDH) ENZYME: CPT

## 2021-02-12 PROCEDURE — 86850 RBC ANTIBODY SCREEN: CPT

## 2021-02-12 PROCEDURE — 81001 URINALYSIS AUTO W/SCOPE: CPT

## 2021-02-12 PROCEDURE — 84550 ASSAY OF BLOOD/URIC ACID: CPT

## 2021-02-12 PROCEDURE — 59025 FETAL NON-STRESS TEST: CPT

## 2021-02-12 PROCEDURE — 85730 THROMBOPLASTIN TIME PARTIAL: CPT

## 2021-02-12 PROCEDURE — 84156 ASSAY OF PROTEIN URINE: CPT

## 2021-02-12 PROCEDURE — G0463: CPT

## 2021-02-12 PROCEDURE — 82570 ASSAY OF URINE CREATININE: CPT

## 2021-02-12 PROCEDURE — 85384 FIBRINOGEN ACTIVITY: CPT

## 2021-02-12 PROCEDURE — 80053 COMPREHEN METABOLIC PANEL: CPT

## 2021-02-12 PROCEDURE — 86900 BLOOD TYPING SEROLOGIC ABO: CPT

## 2021-02-12 PROCEDURE — 99214 OFFICE O/P EST MOD 30 MIN: CPT | Mod: 25

## 2021-02-12 RX ORDER — IBUPROFEN 200 MG
1 TABLET ORAL
Qty: 0 | Refills: 0 | DISCHARGE

## 2021-02-12 RX ORDER — FAMOTIDINE 10 MG/ML
0 INJECTION INTRAVENOUS
Qty: 0 | Refills: 0 | DISCHARGE

## 2021-02-12 RX ORDER — OXYTOCIN 10 UNIT/ML
333.33 VIAL (ML) INJECTION
Qty: 20 | Refills: 0 | Status: DISCONTINUED | OUTPATIENT
Start: 2021-02-19 | End: 2021-02-21

## 2021-02-12 NOTE — OB PST NOTE - PROBLEM SELECTOR PLAN 1
Primary  on 21  Pre-op instructions, including Chlorhexidine soap, provided - all questions answered

## 2021-02-12 NOTE — OB PST NOTE - NSHPPHYSICALEXAM_GEN_ALL_CORE
PHYSICAL EXAMINATION:  General Appearance: well-developed, well-nourished female in no distress.   HEENT: Normocephalic.  Eyes: Conjunctiva pink. PERRLA. Neck: Supple. No lymphadenopathy. Thyroid: No thyromegaly or masses.  Chest: Clear to auscultation  Heart: Regular, S1, S2, trace pedal edema  Abdomen: Soft, nontender. Normoactive bowel sounds.  Extremities: No cyanosis/edema or deformities.  Skin: No rashes

## 2021-02-12 NOTE — OB PST NOTE - ALERT: PERTINENT HISTORY
subchorionic hematoma after 2nd trimester/1st Trimester Sonogram/20 Week Level II Sonogram 1st Trimester Sonogram/20 Week Level II Sonogram

## 2021-02-12 NOTE — OB PST NOTE - HISTORY OF PRESENT ILLNESS
, LMP 21, transfer 21  ENDOMETRIOSIS - treated, RIGHT OVARY CYST , IVF  no other surgeries 36 yo female, PMH epilepsy as a child, LMP 21, , IVF transfer 20. Pt. presents to [facility] for scheduled  for Breech presentation on 21. Pt. denies COVID-19 infection in , denies close contact with anyone that has been ill, no fever, cough, dyspnea in past two weeks.  Pt. was asked to come to hospital at 10:30 this am of elevated BP at Gyn office, Labs were drawn and pt. was stable throughout the whole day.     Pt. and her  are scheduled for COVID-19 testing on 21.

## 2021-02-12 NOTE — OB PST NOTE - NSHPREVIEWOFSYSTEMS_GEN_ALL_CORE
REVIEW OF SYSTEMS:  CONSTITUTIONAL: No weakness, fevers or chills  EYES/ENT: No visual changes;  No vertigo or throat pain   NECK: No pain or stiffness  RESPIRATORY: denies SOB  CARDIOVASCULAR: No chest pain or palpitations, pedal edema  GASTROINTESTINAL: denies nausea and diarrhea  GENITOURINARY: No dysuria, frequency or hematuria  NEUROLOGICAL: No numbness or weakness  SKIN: No itching, rashes

## 2021-02-12 NOTE — OB PROVIDER TRIAGE NOTE - NSOBPROVIDERNOTE_OBGYN_ALL_OB_FT
34yo P0 @ 38w 1 d presents for further evaluation secondary to elevated BP in the office.  Denies HA, visual changes or epigastric pain.  Denies contractions, VB or LOF has + FM    PNC: Dr Guerrero  PNI: none  PNL: GBS negative  Prenatal US: 1/29 breech, 5lbs 12oz    All: latex - rash  Meds: PNV  PMHx: endometriosis  PSHx: 2017 LSC right ov cystectomy  Social hx: Denies x 3  OBhx: Primigravid  GYNhx: Denies fibroids, ov cysts or STDs    T(C): 36.7 (02-12-21 @ 10:11), Max: 36.7 (02-12-21 @ 10:11)  HR: 87 (02-12-21 @ 10:23) (86 - 99)  BP: 117/75 (02-12-21 @ 10:18) (116/71 - 123/82)  RR: 18 (02-12-21 @ 10:11) (18 - 18)  SpO2: 98% (02-12-21 @ 10:23) (97% - 98%)    Gen: NAD  Heart: RRR  Lungs: CTA B/L  Abdomen: Gravid, Nt  Ext: no calf tenderness    A/P 34yo P0 @ 38w 1 d  presents for evaluation for elevated BP  - NST/TOCO  - serial BP's   - HELLP labs, urine P/C ratio    Theresa HIDALGO-JANETTE      OB PA Addendum @ 1215p    T(C): 36.7 (02-12-21 @ 10:11), Max: 36.7 (02-12-21 @ 10:11)  HR: 81 (02-12-21 @ 12:08) (75 - 99)  BP: 113/78 (02-12-21 @ 12:03) (113/72 - 123/82)  RR: 18 (02-12-21 @ 10:11) (18 - 18)  SpO2: 98% (02-12-21 @ 12:08) (97% - 99%)                            10.3   7.61  )-----------( 187      ( 12 Feb 2021 11:26 )             32.1              02-12    137  |  105  |  9   ----------------------------<  75  4.2   |  25  |  0.69    Ca    9.0      12 Feb 2021 11:26    TPro  6.4  /  Alb  3.4  /  TBili  0.3  /  DBili  x   /  AST  14  /  ALT  8<L>  /  AlkPhos  121<H>  02-12         LIVER FUNCTIONS - ( 12 Feb 2021 11:26 )  Alb: 3.4 g/dL / Pro: 6.4 g/dL / ALK PHOS: 121 U/L / ALT: 8 U/L / AST: 14 U/L / GGT: x             PT/INR - ( 12 Feb 2021 11:26 )   PT: 11.6 sec;   INR: 0.97 ratio         PTT - ( 12 Feb 2021 11:26 )  PTT:24.7 sec,  Fibrinogen    P/C ratio Pending    Theresa Mc PA-C 36yo P0 @ 38w 1 d presents for further evaluation secondary to elevated BP in the office.  Denies HA, visual changes or epigastric pain.  Denies contractions, VB or LOF has + FM    PNC: Dr Guerrero  PNI: none  PNL: GBS negative  Prenatal US: 1/29 breech, 5lbs 12oz    All: latex - rash  Meds: PNV  PMHx: endometriosis  PSHx: 2017 LSC right ov cystectomy  Social hx: Denies x 3  OBhx: Primigravid  GYNhx: Denies fibroids, ov cysts or STDs    T(C): 36.7 (02-12-21 @ 10:11), Max: 36.7 (02-12-21 @ 10:11)  HR: 87 (02-12-21 @ 10:23) (86 - 99)  BP: 117/75 (02-12-21 @ 10:18) (116/71 - 123/82)  RR: 18 (02-12-21 @ 10:11) (18 - 18)  SpO2: 98% (02-12-21 @ 10:23) (97% - 98%)    Gen: NAD  Heart: RRR  Lungs: CTA B/L  Abdomen: Gravid, Nt  Ext: no calf tenderness    A/P 36yo P0 @ 38w 1 d  presents for evaluation for elevated BP  - NST/TOCO  - serial BP's   - HELLP labs, urine P/C ratio    Theresa HIDALGO-JANETTE      OB PA Addendum @ 1215p    T(C): 36.7 (02-12-21 @ 10:11), Max: 36.7 (02-12-21 @ 10:11)  HR: 81 (02-12-21 @ 12:08) (75 - 99)  BP: 113/78 (02-12-21 @ 12:03) (113/72 - 123/82)  RR: 18 (02-12-21 @ 10:11) (18 - 18)  SpO2: 98% (02-12-21 @ 12:08) (97% - 99%)                            10.3   7.61  )-----------( 187      ( 12 Feb 2021 11:26 )             32.1              02-12    137  |  105  |  9   ----------------------------<  75  4.2   |  25  |  0.69    Ca    9.0      12 Feb 2021 11:26    TPro  6.4  /  Alb  3.4  /  TBili  0.3  /  DBili  x   /  AST  14  /  ALT  8<L>  /  AlkPhos  121<H>  02-12         LIVER FUNCTIONS - ( 12 Feb 2021 11:26 )  Alb: 3.4 g/dL / Pro: 6.4 g/dL / ALK PHOS: 121 U/L / ALT: 8 U/L / AST: 14 U/L / GGT: x             PT/INR - ( 12 Feb 2021 11:26 )   PT: 11.6 sec;   INR: 0.97 ratio         PTT - ( 12 Feb 2021 11:26 )  PTT:24.7 sec,  Fibrinogen    P/C: 0.1    Theresa Mc PA-C    Addendum @ 2pm  Pt asymptomatic, denies HA, visual changes or epigastric pain  Denies contractions, VB or LOF has + FM  T(C): 36.7 (02-12-21 @ 10:11), Max: 36.7 (02-12-21 @ 10:11)  HR: 80 (02-12-21 @ 13:50) (72 - 99)  BP: 126/74 (02-12-21 @ 13:49) (108/65 - 126/74)  RR: 18 (02-12-21 @ 10:11) (18 - 18)  SpO2: 99%    Gen: NAD  VE: closed/soft    A/P  36yo P0 @ 38w 1 d  presents for evaluation for elevated BP - no evidence of gHTN, normal BP's in triage and normal PEC labs, asymptomatic  - D/ C home  - daily FKC  - increase hydration  - f/u in office as scheduled  - scheduled for c/s for breech presentation on 2/19  -PEC precautions  D/W Dr Cesar Mc PA-C

## 2021-02-12 NOTE — OB PROVIDER TRIAGE NOTE - HISTORY OF PRESENT ILLNESS
34yo P0 @ 38w 1 d presents for further evaluation secondary to elevated BP in the office.  Denies HA, visual changes or epigastric pain.  Denies contractions, VB or LOF has + FM    PNC: Dr Guerrero  PNI: none  PNL: GBS negative  Prenatal US: 1/29 breech, 5lbs 12oz    All: latex - rash  Meds: PNV  PMHx: endometriosis  PSHx: 2017 LSC right ov cystectomy  Social hx: Denies x 3  OBhx: Primigravid  GYNhx: Denies fibroids, ov cysts or STDs    T(C): 36.7 (02-12-21 @ 10:11), Max: 36.7 (02-12-21 @ 10:11)  HR: 87 (02-12-21 @ 10:23) (86 - 99)  BP: 117/75 (02-12-21 @ 10:18) (116/71 - 123/82)  RR: 18 (02-12-21 @ 10:11) (18 - 18)  SpO2: 98% (02-12-21 @ 10:23) (97% - 98%)    Gen: NAD  Heart: RRR  Lungs: CTA B/L  Abdomen: Gravid, Nt  Ext: no calf tenderness    A/P 34yo P0 @ 38w 1 d  presents for evaluation for elevated BP  - NST/TOCO  - serial BP's   - HELLP labs, urine P/C ratio    Theresa Mc PA-C 36yo P0 @ 38w 1 d presents for further evaluation secondary to elevated BP in the office.  Denies HA, visual changes or epigastric pain.  Denies contractions, VB or LOF has + FM    PNC: Dr Guerrero  PNI: none  PNL: GBS negative  Prenatal US: 1/29 breech, 5lbs 12oz    All: latex - rash  Meds: PNV  PMHx: endometriosis  PSHx: 2017 LSC right ov cystectomy  Social hx: Denies x 3  OBhx: Primigravid  GYNhx: Denies fibroids, ov cysts or STDs    T(C): 36.7 (02-12-21 @ 10:11), Max: 36.7 (02-12-21 @ 10:11)  HR: 87 (02-12-21 @ 10:23) (86 - 99)  BP: 117/75 (02-12-21 @ 10:18) (116/71 - 123/82)  RR: 18 (02-12-21 @ 10:11) (18 - 18)  SpO2: 98% (02-12-21 @ 10:23) (97% - 98%)    Gen: NAD  Heart: RRR  Lungs: CTA B/L  Abdomen: Gravid, Nt  Ext: no calf tenderness    A/P 36yo P0 @ 38w 1 d  presents for evaluation for elevated BP  - NST/TOCO  - serial BP's   - HELLP labs, urine P/C ratio    Theresa Mc PA-C

## 2021-02-15 DIAGNOSIS — Z01.818 ENCOUNTER FOR OTHER PREPROCEDURAL EXAMINATION: ICD-10-CM

## 2021-02-16 ENCOUNTER — APPOINTMENT (OUTPATIENT)
Dept: DISASTER EMERGENCY | Facility: CLINIC | Age: 36
End: 2021-02-16

## 2021-02-17 LAB — SARS-COV-2 N GENE NPH QL NAA+PROBE: NOT DETECTED

## 2021-02-18 ENCOUNTER — TRANSCRIPTION ENCOUNTER (OUTPATIENT)
Age: 36
End: 2021-02-18

## 2021-02-19 ENCOUNTER — INPATIENT (INPATIENT)
Facility: HOSPITAL | Age: 36
LOS: 1 days | Discharge: HOME | End: 2021-02-21
Attending: OBSTETRICS & GYNECOLOGY | Admitting: OBSTETRICS & GYNECOLOGY
Payer: COMMERCIAL

## 2021-02-19 VITALS
RESPIRATION RATE: 18 BRPM | HEART RATE: 85 BPM | HEIGHT: 64 IN | DIASTOLIC BLOOD PRESSURE: 79 MMHG | TEMPERATURE: 98 F | OXYGEN SATURATION: 99 % | SYSTOLIC BLOOD PRESSURE: 126 MMHG | WEIGHT: 158.95 LBS

## 2021-02-19 DIAGNOSIS — Z3A.39 39 WEEKS GESTATION OF PREGNANCY: ICD-10-CM

## 2021-02-19 DIAGNOSIS — Z98.890 OTHER SPECIFIED POSTPROCEDURAL STATES: Chronic | ICD-10-CM

## 2021-02-19 LAB
SARS-COV-2 IGG SERPL QL IA: NEGATIVE — SIGNIFICANT CHANGE UP
SARS-COV-2 IGM SERPL IA-ACNC: 0.07 INDEX — SIGNIFICANT CHANGE UP
T PALLIDUM AB TITR SER: NEGATIVE — SIGNIFICANT CHANGE UP

## 2021-02-19 RX ORDER — METOCLOPRAMIDE HCL 10 MG
10 TABLET ORAL ONCE
Refills: 0 | Status: DISCONTINUED | OUTPATIENT
Start: 2021-02-19 | End: 2021-02-19

## 2021-02-19 RX ORDER — LANOLIN
1 OINTMENT (GRAM) TOPICAL EVERY 6 HOURS
Refills: 0 | Status: DISCONTINUED | OUTPATIENT
Start: 2021-02-19 | End: 2021-02-21

## 2021-02-19 RX ORDER — IBUPROFEN 200 MG
600 TABLET ORAL EVERY 6 HOURS
Refills: 0 | Status: COMPLETED | OUTPATIENT
Start: 2021-02-19 | End: 2022-01-18

## 2021-02-19 RX ORDER — OXYCODONE HYDROCHLORIDE 5 MG/1
5 TABLET ORAL ONCE
Refills: 0 | Status: DISCONTINUED | OUTPATIENT
Start: 2021-02-19 | End: 2021-02-21

## 2021-02-19 RX ORDER — MAGNESIUM HYDROXIDE 400 MG/1
30 TABLET, CHEWABLE ORAL
Refills: 0 | Status: DISCONTINUED | OUTPATIENT
Start: 2021-02-19 | End: 2021-02-21

## 2021-02-19 RX ORDER — MORPHINE SULFATE 50 MG/1
0.1 CAPSULE, EXTENDED RELEASE ORAL ONCE
Refills: 0 | Status: DISCONTINUED | OUTPATIENT
Start: 2021-02-19 | End: 2021-02-20

## 2021-02-19 RX ORDER — FERROUS SULFATE 325(65) MG
325 TABLET ORAL DAILY
Refills: 0 | Status: DISCONTINUED | OUTPATIENT
Start: 2021-02-19 | End: 2021-02-21

## 2021-02-19 RX ORDER — CEFAZOLIN SODIUM 1 G
2000 VIAL (EA) INJECTION ONCE
Refills: 0 | Status: DISCONTINUED | OUTPATIENT
Start: 2021-02-19 | End: 2021-02-19

## 2021-02-19 RX ORDER — ACETAMINOPHEN 500 MG
975 TABLET ORAL
Refills: 0 | Status: DISCONTINUED | OUTPATIENT
Start: 2021-02-19 | End: 2021-02-21

## 2021-02-19 RX ORDER — OXYCODONE HYDROCHLORIDE 5 MG/1
5 TABLET ORAL
Refills: 0 | Status: DISCONTINUED | OUTPATIENT
Start: 2021-02-19 | End: 2021-02-20

## 2021-02-19 RX ORDER — NALBUPHINE HYDROCHLORIDE 10 MG/ML
2.5 INJECTION, SOLUTION INTRAMUSCULAR; INTRAVENOUS; SUBCUTANEOUS EVERY 6 HOURS
Refills: 0 | Status: DISCONTINUED | OUTPATIENT
Start: 2021-02-19 | End: 2021-02-20

## 2021-02-19 RX ORDER — KETOROLAC TROMETHAMINE 30 MG/ML
30 SYRINGE (ML) INJECTION EVERY 6 HOURS
Refills: 0 | Status: DISCONTINUED | OUTPATIENT
Start: 2021-02-19 | End: 2021-02-20

## 2021-02-19 RX ORDER — OXYTOCIN 10 UNIT/ML
333.33 VIAL (ML) INJECTION
Qty: 20 | Refills: 0 | Status: DISCONTINUED | OUTPATIENT
Start: 2021-02-19 | End: 2021-02-21

## 2021-02-19 RX ORDER — SODIUM CHLORIDE 9 MG/ML
1000 INJECTION, SOLUTION INTRAVENOUS
Refills: 0 | Status: DISCONTINUED | OUTPATIENT
Start: 2021-02-19 | End: 2021-02-21

## 2021-02-19 RX ORDER — INFLUENZA VIRUS VACCINE 15; 15; 15; 15 UG/.5ML; UG/.5ML; UG/.5ML; UG/.5ML
0.5 SUSPENSION INTRAMUSCULAR ONCE
Refills: 0 | Status: DISCONTINUED | OUTPATIENT
Start: 2021-02-19 | End: 2021-02-21

## 2021-02-19 RX ORDER — OXYCODONE HYDROCHLORIDE 5 MG/1
10 TABLET ORAL
Refills: 0 | Status: DISCONTINUED | OUTPATIENT
Start: 2021-02-19 | End: 2021-02-20

## 2021-02-19 RX ORDER — SODIUM CHLORIDE 9 MG/ML
1000 INJECTION, SOLUTION INTRAVENOUS
Refills: 0 | Status: DISCONTINUED | OUTPATIENT
Start: 2021-02-19 | End: 2021-02-19

## 2021-02-19 RX ORDER — DEXAMETHASONE 0.5 MG/5ML
4 ELIXIR ORAL EVERY 6 HOURS
Refills: 0 | Status: DISCONTINUED | OUTPATIENT
Start: 2021-02-19 | End: 2021-02-20

## 2021-02-19 RX ORDER — ACETAMINOPHEN 500 MG
1000 TABLET ORAL ONCE
Refills: 0 | Status: COMPLETED | OUTPATIENT
Start: 2021-02-19 | End: 2021-02-19

## 2021-02-19 RX ORDER — CITRIC ACID/SODIUM CITRATE 300-500 MG
15 SOLUTION, ORAL ORAL ONCE
Refills: 0 | Status: COMPLETED | OUTPATIENT
Start: 2021-02-19 | End: 2021-02-19

## 2021-02-19 RX ORDER — SODIUM CHLORIDE 9 MG/ML
1000 INJECTION, SOLUTION INTRAVENOUS ONCE
Refills: 0 | Status: COMPLETED | OUTPATIENT
Start: 2021-02-19 | End: 2021-02-19

## 2021-02-19 RX ORDER — ONDANSETRON 8 MG/1
4 TABLET, FILM COATED ORAL EVERY 6 HOURS
Refills: 0 | Status: DISCONTINUED | OUTPATIENT
Start: 2021-02-19 | End: 2021-02-20

## 2021-02-19 RX ORDER — OXYCODONE HYDROCHLORIDE 5 MG/1
5 TABLET ORAL
Refills: 0 | Status: DISCONTINUED | OUTPATIENT
Start: 2021-02-19 | End: 2021-02-21

## 2021-02-19 RX ORDER — DIPHENHYDRAMINE HCL 50 MG
25 CAPSULE ORAL EVERY 6 HOURS
Refills: 0 | Status: DISCONTINUED | OUTPATIENT
Start: 2021-02-19 | End: 2021-02-21

## 2021-02-19 RX ORDER — SIMETHICONE 80 MG/1
80 TABLET, CHEWABLE ORAL EVERY 4 HOURS
Refills: 0 | Status: DISCONTINUED | OUTPATIENT
Start: 2021-02-19 | End: 2021-02-21

## 2021-02-19 RX ORDER — NALOXONE HYDROCHLORIDE 4 MG/.1ML
0.1 SPRAY NASAL
Refills: 0 | Status: DISCONTINUED | OUTPATIENT
Start: 2021-02-19 | End: 2021-02-20

## 2021-02-19 RX ORDER — FAMOTIDINE 10 MG/ML
20 INJECTION INTRAVENOUS ONCE
Refills: 0 | Status: COMPLETED | OUTPATIENT
Start: 2021-02-19 | End: 2021-02-19

## 2021-02-19 RX ORDER — HEPARIN SODIUM 5000 [USP'U]/ML
5000 INJECTION INTRAVENOUS; SUBCUTANEOUS EVERY 12 HOURS
Refills: 0 | Status: DISCONTINUED | OUTPATIENT
Start: 2021-02-19 | End: 2021-02-21

## 2021-02-19 RX ORDER — CHLORHEXIDINE GLUCONATE 213 G/1000ML
1 SOLUTION TOPICAL ONCE
Refills: 0 | Status: COMPLETED | OUTPATIENT
Start: 2021-02-19 | End: 2021-02-19

## 2021-02-19 RX ORDER — TETANUS TOXOID, REDUCED DIPHTHERIA TOXOID AND ACELLULAR PERTUSSIS VACCINE, ADSORBED 5; 2.5; 8; 8; 2.5 [IU]/.5ML; [IU]/.5ML; UG/.5ML; UG/.5ML; UG/.5ML
0.5 SUSPENSION INTRAMUSCULAR ONCE
Refills: 0 | Status: DISCONTINUED | OUTPATIENT
Start: 2021-02-19 | End: 2021-02-21

## 2021-02-19 RX ADMIN — HEPARIN SODIUM 5000 UNIT(S): 5000 INJECTION INTRAVENOUS; SUBCUTANEOUS at 20:16

## 2021-02-19 RX ADMIN — Medication 15 MILLILITER(S): at 11:45

## 2021-02-19 RX ADMIN — Medication 1000 MILLIUNIT(S)/MIN: at 13:44

## 2021-02-19 RX ADMIN — SODIUM CHLORIDE 2000 MILLILITER(S): 9 INJECTION, SOLUTION INTRAVENOUS at 10:00

## 2021-02-19 RX ADMIN — FAMOTIDINE 20 MILLIGRAM(S): 10 INJECTION INTRAVENOUS at 11:44

## 2021-02-19 RX ADMIN — Medication 30 MILLIGRAM(S): at 20:16

## 2021-02-19 RX ADMIN — Medication 975 MILLIGRAM(S): at 23:00

## 2021-02-19 RX ADMIN — CHLORHEXIDINE GLUCONATE 1 APPLICATION(S): 213 SOLUTION TOPICAL at 11:45

## 2021-02-19 RX ADMIN — Medication 400 MILLIGRAM(S): at 14:52

## 2021-02-19 NOTE — OB NEONATOLOGY/PEDIATRICIAN DELIVERY SUMMARY - NSPEDSNEONOTESA_OBGYN_ALL_OB_FT
39.0 week female born to a 34y/o  mom via scheduled CS for breech presentation. Baby was bradycardic to the 80s after epidural placement. On repeat FHR evaluation, HR would vary from . Therefore, decided to proceed with a crash .   Maternal Hx significant for: epilepsy as a child  Ob Hx: chorionic hematoma, endometriosis, IVF   No ROM or labor prior to CS.   Maternal labs: Blood type O+ and COVID unknown. GBS unknown. PNL negative/NR/immune.      Delivery of baby was uncomplicated and baby had spontaneous cry on abdomen.   Resuscitation included drying, bulb suctioning and stimulation.   Apgars 8 and 9.   No abnormalities on physical exam except for a vaginal tag.   EOS not required.    Mom plans to breastfeed exclusively.   Does want hepatitis B vaccine.   [place] is [place].

## 2021-02-19 NOTE — OB RN DELIVERY SUMMARY - AMNIOTIC FLUID COLOR, LABOR
CHIEF COMPLAINT: f/u     SUBJECTIVE / OVERNIGHT EVENTS:   [employer] utilized for [place], ID# [id number]  Patient seen and examined this morning and this afternoon. Spoke with and updated pt's son, Dedrick, and daughter-in-law who were present at bedside, regarding pt's condition. No acute events overnight. Pain well controlled. Patient without adequate BMs. No fevers or chills. Patient weak and deconditioned.  Unable to stand with PT. Patient with poor oral intake.       MEDICATIONS  (STANDING):  aspirin enteric coated 81 milliGRAM(s) Oral daily  cefepime   IVPB 2000 milliGRAM(s) IV Intermittent every 12 hours  enoxaparin Injectable 40 milliGRAM(s) SubCutaneous daily  influenza   Vaccine 0.5 milliLiter(s) IntraMuscular once  lactated ringers. 1000 milliLiter(s) (125 mL/Hr) IV Continuous <Continuous>  pantoprazole  Injectable 40 milliGRAM(s) IV Push at bedtime  silver sulfADIAZINE 1% Cream 1 Application(s) Topical two times a day  vancomycin  IVPB 1250 milliGRAM(s) IV Intermittent every 12 hours    MEDICATIONS  (PRN):  acetaminophen   Tablet .. 650 milliGRAM(s) Oral every 6 hours PRN Temp greater or equal to 38C (100.4F), Mild Pain (1 - 3)  benzocaine 15 mG/menthol 3.6 mG Lozenge 1 Lozenge Oral three times a day PRN Sore Throat  HYDROmorphone  Injectable 0.5 milliGRAM(s) IV Push every 4 hours PRN breakthrough pain  HYDROmorphone  Injectable 0.5 milliGRAM(s) IV Push every 10 minutes PRN Moderate Pain (4 - 6)  ondansetron Injectable 4 milliGRAM(s) IV Push every 6 hours PRN Nausea and/or Vomiting  oxyCODONE    IR 5 milliGRAM(s) Oral every 4 hours PRN Mild and Moderate Pain  oxyCODONE    IR 10 milliGRAM(s) Oral every 4 hours PRN Severe Pain (7 - 10)      VITALS:  T(F): 98.5 (09-27-18 @ 14:19), Max: 100 (09-26-18 @ 16:43)  HR: 69 (09-27-18 @ 14:19) (63 - 85)  BP: 108/55 (09-27-18 @ 14:19) (95/65 - 108/55)  RR: 17 (09-27-18 @ 14:19) (16 - 17)  SpO2: 97% (09-27-18 @ 14:19)        PHYSICAL EXAM:  GENERAL: Laying in bed in NAD, chronically ill, but not toxic appearing  CHEST/LUNG: decreased BS at lung bases b/l but otherwise clear  HEART: Regular rate and rhythm; No murmurs, rubs, or gallops  ABDOMEN/PELVIS: Surgical staples intact. Blanchable erythema left side/flank. RLQ JEREMY drain. mid/left lower quadrant JEREMY drain. Erythema and edema of mons pubis and dependent area of flanks. Viable skin flap.   EXTREMITIES: serous drainage expressed on palpation of medial thigh at surgical incision site. Surgical staples intact with erythematous skin changes laterally on left thigh. 3 JEREMY drains in left thigh. B/l taut LE edema  PSYCH: Alert & Oriented, follows commands      LABS:              8.3                  130  | 24   | 17           16.79 >-----------< 298     ------------------------< 99                    24.9                 4.0  | 95   | 0.79                                         Ca 7.7   Mg x     Ph x            INR: 1.17 ;    PT: 13.5 SEC<H>;    PTT: 20.1 SEC<L>        [ ] Consultant(s) Notes Reviewed:  [x] Care Discussed with Consultants/Other Providers: Orthopedic PA - discussed clear

## 2021-02-19 NOTE — OB PROVIDER H&P - PROBLEM SELECTOR PLAN 1
Admit  Routine labs   IV access and fluids  Bicitra, Pepcid,   cont efm/toco  ancef pre-op  proceed as scheduled.  GWEN Mccord

## 2021-02-19 NOTE — OB PROVIDER H&P - HISTORY OF PRESENT ILLNESS
34 y/o  LINA 21 @ 39.1 wks ga dating by IVF transfer presenting for scheduled elective primary LTCS and breech presentation. +FM. Denies vb, lof ,ctx.  EFW [id number] at 34 weeks. PNC c/b subchorionic hematoma in first trimester which resolved. NPO since 8:30 pm last night.      all: latex - rash  meds: pnv    pmhx: Childhood epilepsy which pt "grew out of".  No meds. No seizures since childhood.  ob: current  gyn: h/o endometriosis diagnosed by clinical history and during cystectomy         '17- [facility] right ovarian cystectomy   surg: see gyn  fhx: denies  soc: denies x 3.

## 2021-02-19 NOTE — OB RN DELIVERY SUMMARY - NS_SEPSISRSKCALC_OBGYN_ALL_OB_FT
EOS calculated successfully. EOS Risk Factor: 0.5/1000 live births ([facility] national incidence); GA=39w1d; Temp=98.2; ROM=0.017; GBS='Negative'; Antibiotics='No antibiotics or any antibiotics < 2 hrs prior to birth'

## 2021-02-19 NOTE — OB PROVIDER DELIVERY SUMMARY - NSPROVIDERDELIVERYNOTE_OBGYN_ALL_OB_FT
34yo  admitted for primary  section 2/2 malpresentation @ 39w1d    viable female infant, apgars , 3204g, lopez breech  hysterotomy closed in single layer with vicryl  grossly normal uterus, tubes and ovaries b/l  interceed placed over hysterotomy and ant uterine wall  1cm subserosal fibroid on left anterior uterine wall  rectus muscles reapproximated with chromic, interrupted  fascia closed with vicryl, subQ closed with plain gut, skin closed in subcuticular fashion    EBL: 800  IVF: 2600  UOP: 75 36yo  admitted for primary  section 2/2 malpresentation @ 39w1d;  arrhythmia noted after anesthesia so start of procedure was expedited.      viable female infant, apgars 9/9, 3204g, lopez breech  hysterotomy closed in single layer with vicryl  grossly normal uterus, tubes and ovaries b/l  interceed placed over hysterotomy and ant uterine wall  1cm subserosal fibroid on left anterior uterine wall  rectus muscles reapproximated with chromic, interrupted  fascia closed with vicryl, subQ closed with plain gut, skin closed in subcuticular fashion    EBL: 800  IVF: 2600  UOP: 75

## 2021-02-19 NOTE — OB RN PATIENT PROFILE - PRO PRENATAL LABS ORI SOURCE HBSAG
Problem: Patient Care Overview  Goal: Plan of Care Review  Outcome: Ongoing (interventions implemented as appropriate)   08/03/19 0438   Coping/Psychosocial   Plan of Care Reviewed With patient   Plan of Care Review   Progress no change   OTHER   Outcome Summary Patient resting in bed utilizing his CPAP machine. Medicated once for pain so far. Scheduled meds given as tolerated. Still need stool for occult blood,. Fluids as tolerated. Remain Sinus Cipriano with rate in the 40' s this shift. . Nursing will continue to monittor.      Goal: Individualization and Mutuality  Outcome: Ongoing (interventions implemented as appropriate)    Goal: Discharge Needs Assessment  Outcome: Ongoing (interventions implemented as appropriate)    Goal: Interprofessional Rounds/Family Conf  Outcome: Ongoing (interventions implemented as appropriate)      Problem: Pain, Chronic (Adult)  Goal: Identify Related Risk Factors and Signs and Symptoms  Outcome: Ongoing (interventions implemented as appropriate)    Goal: Acceptable Pain/Comfort Level and Functional Ability  Outcome: Ongoing (interventions implemented as appropriate)      Problem: Cardiac Output Decreased (Adult)  Goal: Identify Related Risk Factors and Signs and Symptoms  Outcome: Outcome(s) achieved Date Met: 08/03/19    Goal: Effective Tissue Perfusion  Outcome: Ongoing (interventions implemented as appropriate)      Problem: Fall Risk (Adult)  Goal: Identify Related Risk Factors and Signs and Symptoms  Outcome: Ongoing (interventions implemented as appropriate)    Goal: Absence of Fall  Outcome: Ongoing (interventions implemented as appropriate)      Problem: Skin Injury Risk (Adult)  Goal: Identify Related Risk Factors and Signs and Symptoms  Outcome: Outcome(s) achieved Date Met: 08/03/19    Goal: Skin Health and Integrity  Outcome: Ongoing (interventions implemented as appropriate)         hard copy, drawn during this pregnancy

## 2021-02-19 NOTE — CHART NOTE - NSCHARTNOTEFT_GEN_A_CORE
FUENTES HIDALGO:    Bedside ultrasound reveals lopez breech position of fetus with fetal head in maternal RUQ.     GWEN Mccord

## 2021-02-19 NOTE — OB PROVIDER H&P - NSHPPHYSICALEXAM_GEN_ALL_CORE
ICU Vital Signs Last 24 Hrs  HR: 96 (19 Feb 2021 10:15) (96 - 103)  BP: 133/83 (19 Feb 2021 10:15) (133/83 - 134/94)  gen: NAD, A+Ox3  cards: Clear S1S2, RRR  pulm: CTA b/l  abd: soft, gravid. nontender throughout  ve: deferred

## 2021-02-19 NOTE — PRE-ANESTHESIA EVALUATION ADULT - NSANTHPMHFT_GEN_ALL_CORE
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 PMH  s/p laparoscopic ovarian cystectomy 2017  IVF 2020  allergy to latex (NKDA)

## 2021-02-20 LAB
HCT VFR BLD CALC: 27.2 % — LOW (ref 34.5–45)
HGB BLD-MCNC: 8.6 G/DL — LOW (ref 11.5–15.5)
MCHC RBC-ENTMCNC: 27.7 PG — SIGNIFICANT CHANGE UP (ref 27–34)
MCHC RBC-ENTMCNC: 31.6 GM/DL — LOW (ref 32–36)
MCV RBC AUTO: 87.5 FL — SIGNIFICANT CHANGE UP (ref 80–100)
NRBC # BLD: 0 /100 WBCS — SIGNIFICANT CHANGE UP (ref 0–0)
PLATELET # BLD AUTO: 185 K/UL — SIGNIFICANT CHANGE UP (ref 150–400)
RBC # BLD: 3.11 M/UL — LOW (ref 3.8–5.2)
RBC # FLD: 13.3 % — SIGNIFICANT CHANGE UP (ref 10.3–14.5)
WBC # BLD: 12.39 K/UL — HIGH (ref 3.8–10.5)
WBC # FLD AUTO: 12.39 K/UL — HIGH (ref 3.8–10.5)

## 2021-02-20 RX ORDER — IBUPROFEN 200 MG
600 TABLET ORAL EVERY 6 HOURS
Refills: 0 | Status: DISCONTINUED | OUTPATIENT
Start: 2021-02-20 | End: 2021-02-21

## 2021-02-20 RX ADMIN — SIMETHICONE 80 MILLIGRAM(S): 80 TABLET, CHEWABLE ORAL at 05:45

## 2021-02-20 RX ADMIN — Medication 600 MILLIGRAM(S): at 21:16

## 2021-02-20 RX ADMIN — Medication 600 MILLIGRAM(S): at 16:04

## 2021-02-20 RX ADMIN — HEPARIN SODIUM 5000 UNIT(S): 5000 INJECTION INTRAVENOUS; SUBCUTANEOUS at 08:25

## 2021-02-20 RX ADMIN — Medication 975 MILLIGRAM(S): at 05:45

## 2021-02-20 RX ADMIN — Medication 975 MILLIGRAM(S): at 12:43

## 2021-02-20 RX ADMIN — Medication 30 MILLIGRAM(S): at 02:28

## 2021-02-20 RX ADMIN — Medication 1 TABLET(S): at 12:43

## 2021-02-20 RX ADMIN — Medication 975 MILLIGRAM(S): at 18:34

## 2021-02-20 RX ADMIN — Medication 325 MILLIGRAM(S): at 12:44

## 2021-02-20 RX ADMIN — HEPARIN SODIUM 5000 UNIT(S): 5000 INJECTION INTRAVENOUS; SUBCUTANEOUS at 21:16

## 2021-02-20 RX ADMIN — Medication 30 MILLIGRAM(S): at 08:25

## 2021-02-20 NOTE — PROGRESS NOTE ADULT - ASSESSMENT
35y  POD # 1 S/P  repeat/ primary   section, doing well    PMHx:  Endometriosis  Epilepsy childhood  History of ovarian cystectomy right, 2017

## 2021-02-21 ENCOUNTER — TRANSCRIPTION ENCOUNTER (OUTPATIENT)
Age: 36
End: 2021-02-21

## 2021-02-21 VITALS
RESPIRATION RATE: 18 BRPM | SYSTOLIC BLOOD PRESSURE: 123 MMHG | TEMPERATURE: 99 F | HEART RATE: 75 BPM | DIASTOLIC BLOOD PRESSURE: 77 MMHG | OXYGEN SATURATION: 97 %

## 2021-02-21 PROCEDURE — 86900 BLOOD TYPING SEROLOGIC ABO: CPT

## 2021-02-21 PROCEDURE — 59050 FETAL MONITOR W/REPORT: CPT

## 2021-02-21 PROCEDURE — 86769 SARS-COV-2 COVID-19 ANTIBODY: CPT

## 2021-02-21 PROCEDURE — 86780 TREPONEMA PALLIDUM: CPT

## 2021-02-21 PROCEDURE — 59025 FETAL NON-STRESS TEST: CPT

## 2021-02-21 PROCEDURE — C1765: CPT

## 2021-02-21 PROCEDURE — 86901 BLOOD TYPING SEROLOGIC RH(D): CPT

## 2021-02-21 PROCEDURE — 85025 COMPLETE CBC W/AUTO DIFF WBC: CPT

## 2021-02-21 PROCEDURE — 86850 RBC ANTIBODY SCREEN: CPT

## 2021-02-21 RX ORDER — IBUPROFEN 200 MG
1 TABLET ORAL
Qty: 0 | Refills: 0 | DISCHARGE
Start: 2021-02-21

## 2021-02-21 RX ORDER — ACETAMINOPHEN 500 MG
3 TABLET ORAL
Qty: 0 | Refills: 0 | DISCHARGE
Start: 2021-02-21

## 2021-02-21 RX ADMIN — Medication 325 MILLIGRAM(S): at 12:04

## 2021-02-21 RX ADMIN — Medication 975 MILLIGRAM(S): at 06:29

## 2021-02-21 RX ADMIN — Medication 600 MILLIGRAM(S): at 12:04

## 2021-02-21 RX ADMIN — Medication 600 MILLIGRAM(S): at 05:25

## 2021-02-21 RX ADMIN — Medication 975 MILLIGRAM(S): at 00:10

## 2021-02-21 NOTE — DISCHARGE NOTE OB - PATIENT PORTAL LINK FT
You can access the FollowMyHealth Patient Portal offered by [facility] by registering at the following website: [email or web address]. By joining [employer] [facility]’s FollowMyHealth portal, you will also be able to view your health information using other applications (apps) compatible with our system.

## 2021-02-21 NOTE — DISCHARGE NOTE OB - MEDICATION SUMMARY - MEDICATIONS TO CHANGE
Dr Freire I will SWITCH the dose or number of times a day I take the medications listed below when I get home from the hospital:  None

## 2021-02-21 NOTE — DISCHARGE NOTE OB - CARE PLAN
Principal Discharge DX:	 delivery delivered  Goal:	pain control and ambulation  Assessment and plan of treatment:	pain control and ambulation

## 2021-02-21 NOTE — DISCHARGE NOTE OB - ADDITIONAL INSTRUCTIONS
Please call if you have fever above 100.4, pain uncontrolled with pain medication, signs of infection, heavy vaginal bleeding.

## 2021-02-21 NOTE — DISCHARGE NOTE OB - CARE PROVIDER_API CALL
Ángel Pedroza)  Obstetrics and Gynecology  [address] - [address]  [place] [address]  Phone: [phone]  Fax: [phone]  Follow Up Time:

## 2021-02-21 NOTE — PROGRESS NOTE ADULT - SUBJECTIVE AND OBJECTIVE BOX
OB Attending Note      S: Pt feeling well, +FM, pain controlled    Physical exam:    Vital Signs Last 24 Hrs  T(C): 36.6 (2021 17:07), Max: 36.7 (2021 09:00)  T(F): 97.9 (2021 17:07), Max: 98.1 (2021 09:00)  HR: 90 (2021 17:07) (63 - 90)  BP: 132/84 (2021 13:00) (104/69 - 132/84)  BP(mean): --  RR: 18 (2021 17:07) (18 - 18)  SpO2: 98% (2021 17:07) (98% - 99%)  I&O's Summary    2021 07:01  -  2021 07:00  --------------------------------------------------------  IN: 1000 mL / OUT: 2525 mL / NET: -1525 mL        Gen: NAD  Abdomen: Soft, nontender, no distension , firm uterine fundus at umbilicus.  Incision: Clean, dry, and intact  Scant Lochia  Ext: No calf tenderness    LABS:                        8.6    12.39 )-----------( 185      ( 2021 07:13 )             27.2                         Assessment and Plan  POD #1 s/p  section  Doing well.  Continue Ambulation/OOB/Venodynes/heparin  Cont Pain medications  Regular diet  Continue current management    Shirley Montgomery DO        
Day 1 of Anesthesia Pain Management Service    SUBJECTIVE:  Pain Scale Score:          [X] Refer to charted pain scores    THERAPY:    s/p 0.1 mg PF morphine on 2/19      MEDICATIONS  (STANDING):  acetaminophen   Tablet .. 975 milliGRAM(s) Oral <User Schedule>  diphtheria/tetanus/pertussis (acellular) Vaccine (ADAcel) 0.5 milliLiter(s) IntraMuscular once  ferrous    sulfate 325 milliGRAM(s) Oral daily  heparin   Injectable 5000 Unit(s) SubCutaneous every 12 hours  ibuprofen  Tablet. 600 milliGRAM(s) Oral every 6 hours  influenza   Vaccine 0.5 milliLiter(s) IntraMuscular once  ketorolac   Injectable 30 milliGRAM(s) IV Push every 6 hours  lactated ringers. 1000 milliLiter(s) (125 mL/Hr) IV Continuous <Continuous>  morphine PF Spinal 0.1 milliGRAM(s) IntraThecal. once  oxytocin Infusion 333.333 milliUNIT(s)/Min (1000 mL/Hr) IV Continuous <Continuous>  oxytocin Infusion 333.333 milliUNIT(s)/Min (1000 mL/Hr) IV Continuous <Continuous>  prenatal multivitamin 1 Tablet(s) Oral daily    MEDICATIONS  (PRN):  dexAMETHasone  Injectable 4 milliGRAM(s) IV Push every 6 hours PRN Nausea  diphenhydrAMINE 25 milliGRAM(s) Oral every 6 hours PRN Pruritus  lanolin Ointment 1 Application(s) Topical every 6 hours PRN Sore Nipples  magnesium hydroxide Suspension 30 milliLiter(s) Oral two times a day PRN Constipation  nalbuphine Injectable 2.5 milliGRAM(s) IV Push every 6 hours PRN Pruritus  naloxone Injectable 0.1 milliGRAM(s) IV Push every 3 minutes PRN For ANY of the following changes in patient status:  A. Breaths Per Minute LESS THAN 10, B. Oxygen saturation LESS THAN 90%, C. Sedation score of 6 for Stop After: 4 Times  ondansetron Injectable 4 milliGRAM(s) IV Push every 6 hours PRN Nausea  oxyCODONE    IR 5 milliGRAM(s) Oral every 3 hours PRN Mild Pain (1 - 3)  oxyCODONE    IR 10 milliGRAM(s) Oral every 3 hours PRN Moderate Pain (4 - 6)  oxyCODONE    IR 5 milliGRAM(s) Oral every 3 hours PRN Moderate to Severe Pain (4-10)  oxyCODONE    IR 5 milliGRAM(s) Oral once PRN Moderate to Severe Pain (4-10)  simethicone 80 milliGRAM(s) Chew every 4 hours PRN Gas      OBJECTIVE:    Sedation:        	[X] Alert	[ ] Drowsy	[ ] Arousable      [ ] Asleep       [ ] Unresponsive    Side Effects:	[X] None	[ ] Nausea	[ ] Vomiting         [ ] Pruritus  		[ ] Weakness            [ ] Numbness	          [ ] Other:    Vital Signs Last 24 Hrs  T(C): 36.4 (20 Feb 2021 05:12), Max: 37.1 (19 Feb 2021 17:50)  T(F): 97.6 (20 Feb 2021 05:12), Max: 98.7 (19 Feb 2021 17:50)  HR: 63 (20 Feb 2021 05:12) (63 - 104)  BP: 104/69 (20 Feb 2021 05:12) (104/69 - 134/94)  BP(mean): 84 (19 Feb 2021 15:30) (80 - 87)  RR: 18 (20 Feb 2021 05:12) (13 - 21)  SpO2: 98% (20 Feb 2021 05:12) (96% - 100%)    ASSESSMENT/ PLAN  [X] Patient transitioned to prn analgesics  [X] Pain management per primary service, pain service to sign off   [X]Documentation and Verification of current medications
OB Attending Note      S: Pt feeling well, +Flatus, pain controlled. ambulating, voiding and tolerating PO.     Physical exam:    Vital Signs Last 24 Hrs  T(C): 36.8 (2021 05:38), Max: 36.8 (2021 05:38)  T(F): 98.2 (2021 05:38), Max: 98.2 (2021 05:38)  HR: 79 (2021 05:38) (70 - 90)  BP: 124/78 (2021 05:38) (124/77 - 132/84)  BP(mean): --  RR: 18 (2021 05:38) (18 - 18)  SpO2: 98% (2021 05:38) (98% - 99%)  I&O's Summary      Gen: NAD  Abdomen: Soft, nontender, no distension , firm uterine fundus at umbilicus.  Incision: Clean, dry, and intact with steri strips  Scant Lochia  Ext: No calf tenderness    LABS:                        8.6    12.39 )-----------( 185      ( 2021 07:13 )             27.2                         Assessment and Plan  POD #2 s/p  section  Doing well.  Continue Ambulation/OOB/Venodynes/heparin  Cont Pain medications  Regular diet  discharge home. discharge instructions reviewed. FU in office in 2 weeks    Shirley Montgomery DO          
Postpartum Note-  Section POD#1    Allergies    latex (Unknown)  No Known Drug Allergies    Intolerances        Blood Type  O  --  Positive  Rubella Immune  RPR Neg      S: Patient is a  35y     POD#1 S/P  primary  C/Sec  Patient w/o complaints, pain is controlled.  Pt is OOB, tolerating PO, passing flatus. Lochia WNL.   Feeding: Breast feeding    O:  Vital Signs Last 24 Hrs  T(C): 36.4 (2021 05:12), Max: 37.1 (2021 17:50)  T(F): 97.6 (2021 05:12), Max: 98.7 (2021 17:50)  HR: 63 (2021 05:12) (63 - 104)  BP: 104/69 (2021 05:12) (104/69 - 134/94)  BP(mean): 84 (2021 15:30) (80 - 87)  RR: 18 (2021 05:12) (13 - 21)  SpO2: 98% (2021 05:12) (96% - 100%)  I&O's Summary    2021 07:01  -  2021 07:00  --------------------------------------------------------  IN: 1000 mL / OUT: 2525 mL / NET: -1525 mL        Gen: NAD  CV: rrr s1s2, CTABL  Abdomen: Soft, nontender, non-distended, fundus firm.  Bowel sounds x 4 quadrants  Incision: Clean, dry, and intact.  Negative erythema/edema/ecchymosis   Sub Q  Lochia WNL  Ext: PAS in place. Negative Homans B/L.  Pedal pulses palpated B/L    LABS:        A/P:  35y  POD # 1 S/P  repeat/ primary   section, doing well    PMHx:  Endometriosis  Epilepsy childhood  History of ovarian cystectomy right,     Current Issues: None    Increase OOB  DVT ppx  Dressing removed  Due to void  Regular diet  AM CBC  Routine Postpartum/Post-op care    Shaista Dowd PA-C

## 2021-02-26 NOTE — OB PROVIDER H&P - NSPRIMARYCAREPROV_OBGYN_ALL_OB
MD//ASHIA/MAURI Clofazimine Pregnancy And Lactation Text: This medication is Pregnancy Category C and isn't considered safe during pregnancy. It is excreted in breast milk.

## 2021-03-30 ENCOUNTER — RESULT REVIEW (OUTPATIENT)
Age: 36
End: 2021-03-30

## 2021-05-06 NOTE — OB PROVIDER H&P - ASSESSMENT
Addended by: DORYS PATTEN on: 5/6/2021 03:04 PM     Modules accepted: Orders     36 y/o  LINA 21 @ 39.1 wks ga dating by IVF transfer presenting for scheduled elective primary LTCS and breech presentation. +FM. Denies vb, lof ,ctx.  EFW [id number] at 34 weeks. PNC c/b subchorionic hematoma in first trimester which resolved. NPO since 8:30 pm last night.      all: latex - rash  meds: pnv    pmhx: Childhood epilepsy which pt "grew out of".  No meds. No seizures since childhood.  ob: current  gyn: h/o endometriosis diagnosed by clinical history and during cystectomy         '17- [facility] right ovarian cystectomy   surg: see gyn  fhx: denies  soc: denies x 3.

## 2021-08-18 NOTE — PACU DISCHARGE NOTE - HYDRATION STATUS:
Called pt notified prescription has been refilled by Dr. Levon Gutierrez also set up an appointment in September at [facility] for follow up Satisfactory

## 2021-09-05 ENCOUNTER — TRANSCRIPTION ENCOUNTER (OUTPATIENT)
Age: 36
End: 2021-09-05

## 2022-02-16 NOTE — H&P PST ADULT - PRO ANTICIPATED DISCH DISP
Ok to try Rx macrobid 100mg bid x 7 days  Increase hydration  Daily cranberry tab
Per your instruction
home

## 2022-10-17 ENCOUNTER — RESULT REVIEW (OUTPATIENT)
Age: 37
End: 2022-10-17

## 2023-02-15 ENCOUNTER — NON-APPOINTMENT (OUTPATIENT)
Age: 38
End: 2023-02-15

## 2023-07-21 NOTE — OB PST NOTE - PRO MENTAL HEALTH SX RECENT
Additional Notes: He has been using CeraVe cleanser, SA toner, SA serum, and The Ordinary moisturizer. Today I will start him on clindamycin, tretinoin, and minocycline. While he is using tretinoin I recommended he hold on his SA products until his skin has adjusted to tretinoin. Render Risk Assessment In Note?: no Detail Level: Simple Additional Notes: A clinical assistant was present for the exam. Care instructions were explained to the patient in detail. Told patient to call with any concerns or questions. The patient verbalized understanding and agreement of the education provided and the treatment plan. Encouraged patient to schedule a follow up appointment right after visit. At the end of the visit, all questions had been answered and the patient was satisfied with the visit. none

## 2023-07-21 NOTE — H&P PST ADULT - VENOUS THROMBOEMBOLISM
Problem: Adult Inpatient Plan of Care  Goal: Plan of Care Review  Outcome: Ongoing, Progressing     Problem: Skin Injury Risk Increased  Goal: Skin Health and Integrity  Outcome: Ongoing, Progressing     Problem: Glycemic Control Impaired (Surgery Nonspecified)  Goal: Blood Glucose Level Within Targeted Range  Outcome: Ongoing, Progressing      no

## 2023-09-29 PROBLEM — N80.9 ENDOMETRIOSIS, UNSPECIFIED: Chronic | Status: ACTIVE | Noted: 2021-02-12

## 2023-11-25 ENCOUNTER — NON-APPOINTMENT (OUTPATIENT)
Age: 38
End: 2023-11-25

## 2023-12-11 ENCOUNTER — APPOINTMENT (OUTPATIENT)
Dept: ANTEPARTUM | Facility: CLINIC | Age: 38
End: 2023-12-11
Payer: COMMERCIAL

## 2023-12-11 ENCOUNTER — ASOB RESULT (OUTPATIENT)
Age: 38
End: 2023-12-11

## 2023-12-11 PROCEDURE — 76811 OB US DETAILED SNGL FETUS: CPT

## 2024-04-10 ENCOUNTER — OUTPATIENT (OUTPATIENT)
Dept: OUTPATIENT SERVICES | Facility: HOSPITAL | Age: 39
LOS: 1 days | End: 2024-04-10
Payer: COMMERCIAL

## 2024-04-10 VITALS
WEIGHT: 171.96 LBS | HEART RATE: 77 BPM | DIASTOLIC BLOOD PRESSURE: 74 MMHG | SYSTOLIC BLOOD PRESSURE: 116 MMHG | RESPIRATION RATE: 12 BRPM | HEIGHT: 63 IN | OXYGEN SATURATION: 97 % | TEMPERATURE: 99 F

## 2024-04-10 DIAGNOSIS — Z01.818 ENCOUNTER FOR OTHER PREPROCEDURAL EXAMINATION: ICD-10-CM

## 2024-04-10 DIAGNOSIS — Z98.891 HISTORY OF UTERINE SCAR FROM PREVIOUS SURGERY: Chronic | ICD-10-CM

## 2024-04-10 DIAGNOSIS — O34.219 MATERNAL CARE FOR UNSPECIFIED TYPE SCAR FROM PREVIOUS CESAREAN DELIVERY: ICD-10-CM

## 2024-04-10 DIAGNOSIS — Z29.9 ENCOUNTER FOR PROPHYLACTIC MEASURES, UNSPECIFIED: ICD-10-CM

## 2024-04-10 DIAGNOSIS — Z98.890 OTHER SPECIFIED POSTPROCEDURAL STATES: Chronic | ICD-10-CM

## 2024-04-10 LAB
ANION GAP SERPL CALC-SCNC: 14 MMOL/L — SIGNIFICANT CHANGE UP (ref 5–17)
BLD GP AB SCN SERPL QL: NEGATIVE — SIGNIFICANT CHANGE UP
BUN SERPL-MCNC: 9 MG/DL — SIGNIFICANT CHANGE UP (ref 7–23)
CALCIUM SERPL-MCNC: 9.8 MG/DL — SIGNIFICANT CHANGE UP (ref 8.4–10.5)
CHLORIDE SERPL-SCNC: 101 MMOL/L — SIGNIFICANT CHANGE UP (ref 96–108)
CO2 SERPL-SCNC: 20 MMOL/L — LOW (ref 22–31)
CREAT SERPL-MCNC: 0.56 MG/DL — SIGNIFICANT CHANGE UP (ref 0.5–1.3)
EGFR: 120 ML/MIN/1.73M2 — SIGNIFICANT CHANGE UP
GLUCOSE SERPL-MCNC: 74 MG/DL — SIGNIFICANT CHANGE UP (ref 70–99)
HCT VFR BLD CALC: 35.1 % — SIGNIFICANT CHANGE UP (ref 34.5–45)
HGB BLD-MCNC: 11.2 G/DL — LOW (ref 11.5–15.5)
MCHC RBC-ENTMCNC: 28.7 PG — SIGNIFICANT CHANGE UP (ref 27–34)
MCHC RBC-ENTMCNC: 31.9 GM/DL — LOW (ref 32–36)
MCV RBC AUTO: 90 FL — SIGNIFICANT CHANGE UP (ref 80–100)
NRBC # BLD: 0 /100 WBCS — SIGNIFICANT CHANGE UP (ref 0–0)
PLATELET # BLD AUTO: 183 K/UL — SIGNIFICANT CHANGE UP (ref 150–400)
POTASSIUM SERPL-MCNC: 4.1 MMOL/L — SIGNIFICANT CHANGE UP (ref 3.5–5.3)
POTASSIUM SERPL-SCNC: 4.1 MMOL/L — SIGNIFICANT CHANGE UP (ref 3.5–5.3)
RBC # BLD: 3.9 M/UL — SIGNIFICANT CHANGE UP (ref 3.8–5.2)
RBC # FLD: 15 % — HIGH (ref 10.3–14.5)
RH IG SCN BLD-IMP: POSITIVE — SIGNIFICANT CHANGE UP
SODIUM SERPL-SCNC: 135 MMOL/L — SIGNIFICANT CHANGE UP (ref 135–145)
WBC # BLD: 7.75 K/UL — SIGNIFICANT CHANGE UP (ref 3.8–10.5)
WBC # FLD AUTO: 7.75 K/UL — SIGNIFICANT CHANGE UP (ref 3.8–10.5)

## 2024-04-10 PROCEDURE — 86850 RBC ANTIBODY SCREEN: CPT

## 2024-04-10 PROCEDURE — 36415 COLL VENOUS BLD VENIPUNCTURE: CPT

## 2024-04-10 PROCEDURE — 85027 COMPLETE CBC AUTOMATED: CPT

## 2024-04-10 PROCEDURE — 80048 BASIC METABOLIC PNL TOTAL CA: CPT

## 2024-04-10 PROCEDURE — G0463: CPT

## 2024-04-10 PROCEDURE — 86901 BLOOD TYPING SEROLOGIC RH(D): CPT

## 2024-04-10 PROCEDURE — 86900 BLOOD TYPING SEROLOGIC ABO: CPT

## 2024-04-10 NOTE — OB PST NOTE - HISTORY OF PRESENT ILLNESS
39 year old pregnant female, LMP 23, LINA 24, with PMH of  2021, ovarian cystectomy , endometriosis, epilepsy as a child- last seizure around age 11- off anticonvulsants since childhood, anemia in pregnancy- on oral iron supplement. She presents today to PST prior to scheduled Repeat  on 24.     Patient states that when she was given anesthesia with her previous  she "felt like her head would explode" and was given something to make her feel better. She is unsure which medications she was given or what gave her this feeling.

## 2024-04-10 NOTE — OB PST NOTE - ACTIVITY
Patient states she is busy caring for her toddler, and she can walk 2-3 blocks and up a flight of stairs with no symptoms.

## 2024-04-10 NOTE — OB PST NOTE - ASSESSMENT
PUJAI VTE 2.0 SCORE [CLOT updated 2019]    AGE RELATED RISK FACTORS                                                       MOBILITY RELATED FACTORS  [ ] Age 41-60 years                                            (1 Point)                    [ ] Bed rest                                                        (1 Point)  [ ] Age: 61-74 years                                           (2 Points)                  [ ] Plaster cast                                                   (2 Points)  [ ] Age= 75 years                                              (3 Points)                    [ ] Bed bound for more than 72 hours                 (2 Points)    DISEASE RELATED RISK FACTORS                                               GENDER SPECIFIC FACTORS  [ ] Edema in the lower extremities                       (1 Point)              [X ] Pregnancy                                                     (1 Point)  [ ] Varicose veins                                               (1 Point)                     [ ] Post-partum < 6 weeks                                   (1 Point)             [X ] BMI > 25 Kg/m2                                            (1 Point)                     [ ] Hormonal therapy  or oral contraception          (1 Point)                 [ ] Sepsis (in the previous month)                        (1 Point)               [ ] History of pregnancy complications                 (1 point)  [ ] Pneumonia or serious lung disease                                               [ ] Unexplained or recurrent                     (1 Point)           (in the previous month)                               (1 Point)  [ ] Abnormal pulmonary function test                     (1 Point)                 SURGERY RELATED RISK FACTORS  [ ] Acute myocardial infarction                              (1 Point)               [X ]  Section                                             (1 Point)  [ ] Congestive heart failure (in the previous month)  (1 Point)      [ ] Minor surgery                                                  (1 Point)   [ ] Inflammatory bowel disease                             (1 Point)               [ ] Arthroscopic surgery                                        (2 Points)  [ ] Central venous access                                      (2 Points)                [ ] General surgery lasting more than 45 minutes (2 points)  [ ] Malignancy- Present or previous                   (2 Points)                [ ] Elective arthroplasty                                         (5 points)    [ ] Stroke (in the previous month)                          (5 Points)                                                                                                                                                           HEMATOLOGY RELATED FACTORS                                                 TRAUMA RELATED RISK FACTORS  [ ] Prior episodes of VTE                                     (3 Points)                [ ] Fracture of the hip, pelvis, or leg                       (5 Points)  [ ] Positive family history for VTE                         (3 Points)             [ ] Acute spinal cord injury (in the previous month)  (5 Points)  [ ] Prothrombin 59841 A                                     (3 Points)               [ ] Paralysis  (less than 1 month)                             (5 Points)  [ ] Factor V Leiden                                             (3 Points)                  [ ] Multiple Trauma within 1 month                        (5 Points)  [ ] Lupus anticoagulants                                     (3 Points)                                                           [ ] Anticardiolipin antibodies                               (3 Points)                                                       [ ] High homocysteine in the blood                      (3 Points)                                             [ ] Other congenital or acquired thrombophilia      (3 Points)                                                [ ] Heparin induced thrombocytopenia                  (3 Points)                                     Total Score [    3      ]

## 2024-04-10 NOTE — OB PST NOTE - NS_OBGYNHISTORY_OBGYN_ALL_OB_FT
39 year old pregnant female, LMP 23, LINA 24, with PMH of  2021, ovarian cystectomy 2017, endometriosis. She presents today to PST prior to scheduled Repeat  on 24.

## 2024-04-10 NOTE — OB PST NOTE - PROBLEM SELECTOR PLAN 1
Repeat  on 24.  CBC, BMP, and Type&screen done today at [facility].  Pre op instructions, including chlorhexidine, provided and all questions answered.

## 2024-04-10 NOTE — OB PST NOTE - FALL HARM RISK - UNIVERSAL INTERVENTIONS
Bed in lowest position, wheels locked, appropriate side rails in place/Call bell, personal items and telephone in reach/Instruct patient to call for assistance before getting out of bed or chair/Non-slip footwear when patient is out of bed/[place] to call system/Physically safe environment - no spills, clutter or unnecessary equipment/Purposeful Proactive Rounding/Room/bathroom lighting operational, light cord in reach

## 2024-04-19 NOTE — OB RN PATIENT PROFILE - HIV: DATE, OB PROFILE
Class II - visualization of the soft palate, fauces, and uvula
Class II - visualization of the soft palate, fauces, and uvula
27-Jul-2020

## 2024-04-22 ENCOUNTER — TRANSCRIPTION ENCOUNTER (OUTPATIENT)
Age: 39
End: 2024-04-22

## 2024-04-22 ENCOUNTER — INPATIENT (INPATIENT)
Facility: HOSPITAL | Age: 39
LOS: 2 days | Discharge: HOME | DRG: 951 | End: 2024-04-25
Attending: OBSTETRICS & GYNECOLOGY | Admitting: OBSTETRICS & GYNECOLOGY
Payer: COMMERCIAL

## 2024-04-22 VITALS — OXYGEN SATURATION: 96 % | TEMPERATURE: 98 F

## 2024-04-22 DIAGNOSIS — Z98.890 OTHER SPECIFIED POSTPROCEDURAL STATES: Chronic | ICD-10-CM

## 2024-04-22 DIAGNOSIS — O26.899 OTHER SPECIFIED PREGNANCY RELATED CONDITIONS, UNSPECIFIED TRIMESTER: ICD-10-CM

## 2024-04-22 DIAGNOSIS — Z98.891 HISTORY OF UTERINE SCAR FROM PREVIOUS SURGERY: Chronic | ICD-10-CM

## 2024-04-23 ENCOUNTER — TRANSCRIPTION ENCOUNTER (OUTPATIENT)
Age: 39
End: 2024-04-23

## 2024-04-23 DIAGNOSIS — Z34.80 ENCOUNTER FOR SUPERVISION OF OTHER NORMAL PREGNANCY, UNSPECIFIED TRIMESTER: ICD-10-CM

## 2024-04-23 LAB
BASOPHILS # BLD AUTO: 0.01 K/UL — SIGNIFICANT CHANGE UP (ref 0–0.2)
BASOPHILS NFR BLD AUTO: 0.1 % — SIGNIFICANT CHANGE UP (ref 0–2)
BLD GP AB SCN SERPL QL: NEGATIVE — SIGNIFICANT CHANGE UP
EOSINOPHIL # BLD AUTO: 0.07 K/UL — SIGNIFICANT CHANGE UP (ref 0–0.5)
EOSINOPHIL NFR BLD AUTO: 0.9 % — SIGNIFICANT CHANGE UP (ref 0–6)
HCT VFR BLD CALC: 35.9 % — SIGNIFICANT CHANGE UP (ref 34.5–45)
HGB BLD-MCNC: 11.7 G/DL — SIGNIFICANT CHANGE UP (ref 11.5–15.5)
IMM GRANULOCYTES NFR BLD AUTO: 0.4 % — SIGNIFICANT CHANGE UP (ref 0–0.9)
LYMPHOCYTES # BLD AUTO: 1.74 K/UL — SIGNIFICANT CHANGE UP (ref 1–3.3)
LYMPHOCYTES # BLD AUTO: 23.5 % — SIGNIFICANT CHANGE UP (ref 13–44)
MCHC RBC-ENTMCNC: 28.9 PG — SIGNIFICANT CHANGE UP (ref 27–34)
MCHC RBC-ENTMCNC: 32.6 GM/DL — SIGNIFICANT CHANGE UP (ref 32–36)
MCV RBC AUTO: 88.6 FL — SIGNIFICANT CHANGE UP (ref 80–100)
MONOCYTES # BLD AUTO: 0.63 K/UL — SIGNIFICANT CHANGE UP (ref 0–0.9)
MONOCYTES NFR BLD AUTO: 8.5 % — SIGNIFICANT CHANGE UP (ref 2–14)
NEUTROPHILS # BLD AUTO: 4.94 K/UL — SIGNIFICANT CHANGE UP (ref 1.8–7.4)
NEUTROPHILS NFR BLD AUTO: 66.6 % — SIGNIFICANT CHANGE UP (ref 43–77)
NRBC # BLD: 0 /100 WBCS — SIGNIFICANT CHANGE UP (ref 0–0)
PLATELET # BLD AUTO: 164 K/UL — SIGNIFICANT CHANGE UP (ref 150–400)
RBC # BLD: 4.05 M/UL — SIGNIFICANT CHANGE UP (ref 3.8–5.2)
RBC # FLD: 14.5 % — SIGNIFICANT CHANGE UP (ref 10.3–14.5)
RH IG SCN BLD-IMP: POSITIVE — SIGNIFICANT CHANGE UP
T PALLIDUM AB TITR SER: NEGATIVE — SIGNIFICANT CHANGE UP
WBC # BLD: 7.42 K/UL — SIGNIFICANT CHANGE UP (ref 3.8–10.5)
WBC # FLD AUTO: 7.42 K/UL — SIGNIFICANT CHANGE UP (ref 3.8–10.5)

## 2024-04-23 PROCEDURE — 88307 TISSUE EXAM BY PATHOLOGIST: CPT | Mod: 26

## 2024-04-23 DEVICE — SURGICEL POWDER 3 GRAMS: Type: IMPLANTABLE DEVICE | Status: FUNCTIONAL

## 2024-04-23 DEVICE — INTERCEED 3 X 4": Type: IMPLANTABLE DEVICE | Status: FUNCTIONAL

## 2024-04-23 RX ORDER — IBUPROFEN 200 MG
600 TABLET ORAL EVERY 6 HOURS
Refills: 0 | Status: COMPLETED | OUTPATIENT
Start: 2024-04-23 | End: 2025-03-22

## 2024-04-23 RX ORDER — NALBUPHINE HYDROCHLORIDE 10 MG/ML
2.5 INJECTION, SOLUTION INTRAMUSCULAR; INTRAVENOUS; SUBCUTANEOUS EVERY 6 HOURS
Refills: 0 | Status: DISCONTINUED | OUTPATIENT
Start: 2024-04-23 | End: 2024-04-25

## 2024-04-23 RX ORDER — MAGNESIUM HYDROXIDE 400 MG/1
30 TABLET, CHEWABLE ORAL
Refills: 0 | Status: DISCONTINUED | OUTPATIENT
Start: 2024-04-23 | End: 2024-04-25

## 2024-04-23 RX ORDER — MORPHINE SULFATE 50 MG/1
0.1 CAPSULE, EXTENDED RELEASE ORAL ONCE
Refills: 0 | Status: DISCONTINUED | OUTPATIENT
Start: 2024-04-23 | End: 2024-04-24

## 2024-04-23 RX ORDER — OXYTOCIN 10 UNIT/ML
333.33 VIAL (ML) INJECTION
Qty: 20 | Refills: 0 | Status: DISCONTINUED | OUTPATIENT
Start: 2024-04-23 | End: 2024-04-25

## 2024-04-23 RX ORDER — CHLORHEXIDINE GLUCONATE 213 G/1000ML
1 SOLUTION TOPICAL DAILY
Refills: 0 | Status: DISCONTINUED | OUTPATIENT
Start: 2024-04-23 | End: 2024-04-23

## 2024-04-23 RX ORDER — DEXAMETHASONE 0.5 MG/5ML
4 ELIXIR ORAL EVERY 6 HOURS
Refills: 0 | Status: DISCONTINUED | OUTPATIENT
Start: 2024-04-23 | End: 2024-04-25

## 2024-04-23 RX ORDER — CITRIC ACID/SODIUM CITRATE 300-500 MG
30 SOLUTION, ORAL ORAL ONCE
Refills: 0 | Status: COMPLETED | OUTPATIENT
Start: 2024-04-23 | End: 2024-04-23

## 2024-04-23 RX ORDER — SODIUM CHLORIDE 9 MG/ML
1000 INJECTION, SOLUTION INTRAVENOUS
Refills: 0 | Status: DISCONTINUED | OUTPATIENT
Start: 2024-04-23 | End: 2024-04-25

## 2024-04-23 RX ORDER — FAMOTIDINE 10 MG/ML
20 INJECTION INTRAVENOUS ONCE
Refills: 0 | Status: COMPLETED | OUTPATIENT
Start: 2024-04-23 | End: 2024-04-23

## 2024-04-23 RX ORDER — HEPARIN SODIUM 5000 [USP'U]/ML
5000 INJECTION INTRAVENOUS; SUBCUTANEOUS EVERY 12 HOURS
Refills: 0 | Status: DISCONTINUED | OUTPATIENT
Start: 2024-04-23 | End: 2024-04-25

## 2024-04-23 RX ORDER — OXYCODONE HYDROCHLORIDE 5 MG/1
5 TABLET ORAL
Refills: 0 | Status: DISCONTINUED | OUTPATIENT
Start: 2024-04-23 | End: 2024-04-23

## 2024-04-23 RX ORDER — SIMETHICONE 80 MG/1
80 TABLET, CHEWABLE ORAL EVERY 4 HOURS
Refills: 0 | Status: DISCONTINUED | OUTPATIENT
Start: 2024-04-23 | End: 2024-04-25

## 2024-04-23 RX ORDER — OXYCODONE HYDROCHLORIDE 5 MG/1
5 TABLET ORAL
Refills: 0 | Status: COMPLETED | OUTPATIENT
Start: 2024-04-23 | End: 2024-04-30

## 2024-04-23 RX ORDER — LANOLIN
1 OINTMENT (GRAM) TOPICAL EVERY 6 HOURS
Refills: 0 | Status: DISCONTINUED | OUTPATIENT
Start: 2024-04-23 | End: 2024-04-25

## 2024-04-23 RX ORDER — KETOROLAC TROMETHAMINE 30 MG/ML
30 SYRINGE (ML) INJECTION EVERY 6 HOURS
Refills: 0 | Status: DISCONTINUED | OUTPATIENT
Start: 2024-04-23 | End: 2024-04-24

## 2024-04-23 RX ORDER — ACETAMINOPHEN 500 MG
975 TABLET ORAL
Refills: 0 | Status: DISCONTINUED | OUTPATIENT
Start: 2024-04-23 | End: 2024-04-25

## 2024-04-23 RX ORDER — NALOXONE HYDROCHLORIDE 4 MG/.1ML
0.1 SPRAY NASAL
Refills: 0 | Status: DISCONTINUED | OUTPATIENT
Start: 2024-04-23 | End: 2024-04-25

## 2024-04-23 RX ORDER — INFLUENZA VIRUS VACCINE 15; 15; 15; 15 UG/.5ML; UG/.5ML; UG/.5ML; UG/.5ML
0.5 SUSPENSION INTRAMUSCULAR ONCE
Refills: 0 | Status: DISCONTINUED | OUTPATIENT
Start: 2024-04-23 | End: 2024-04-25

## 2024-04-23 RX ORDER — DIPHENHYDRAMINE HCL 50 MG
25 CAPSULE ORAL EVERY 6 HOURS
Refills: 0 | Status: DISCONTINUED | OUTPATIENT
Start: 2024-04-23 | End: 2024-04-25

## 2024-04-23 RX ORDER — ONDANSETRON 8 MG/1
4 TABLET, FILM COATED ORAL EVERY 6 HOURS
Refills: 0 | Status: DISCONTINUED | OUTPATIENT
Start: 2024-04-23 | End: 2024-04-25

## 2024-04-23 RX ORDER — CEFAZOLIN SODIUM 1 G
2000 VIAL (EA) INJECTION ONCE
Refills: 0 | Status: COMPLETED | OUTPATIENT
Start: 2024-04-23 | End: 2024-04-23

## 2024-04-23 RX ORDER — TETANUS TOXOID, REDUCED DIPHTHERIA TOXOID AND ACELLULAR PERTUSSIS VACCINE, ADSORBED 5; 2.5; 8; 8; 2.5 [IU]/.5ML; [IU]/.5ML; UG/.5ML; UG/.5ML; UG/.5ML
0.5 SUSPENSION INTRAMUSCULAR ONCE
Refills: 0 | Status: DISCONTINUED | OUTPATIENT
Start: 2024-04-23 | End: 2024-04-25

## 2024-04-23 RX ORDER — SODIUM CHLORIDE 9 MG/ML
1000 INJECTION, SOLUTION INTRAVENOUS
Refills: 0 | Status: DISCONTINUED | OUTPATIENT
Start: 2024-04-23 | End: 2024-04-23

## 2024-04-23 RX ORDER — OXYCODONE HYDROCHLORIDE 5 MG/1
5 TABLET ORAL ONCE
Refills: 0 | Status: DISCONTINUED | OUTPATIENT
Start: 2024-04-23 | End: 2024-04-25

## 2024-04-23 RX ADMIN — FAMOTIDINE 20 MILLIGRAM(S): 10 INJECTION INTRAVENOUS at 00:44

## 2024-04-23 RX ADMIN — Medication 975 MILLIGRAM(S): at 08:21

## 2024-04-23 RX ADMIN — Medication 30 MILLIGRAM(S): at 18:44

## 2024-04-23 RX ADMIN — Medication 975 MILLIGRAM(S): at 16:21

## 2024-04-23 RX ADMIN — Medication 975 MILLIGRAM(S): at 08:51

## 2024-04-23 RX ADMIN — HEPARIN SODIUM 5000 UNIT(S): 5000 INJECTION INTRAVENOUS; SUBCUTANEOUS at 18:44

## 2024-04-23 RX ADMIN — Medication 975 MILLIGRAM(S): at 22:00

## 2024-04-23 RX ADMIN — Medication 975 MILLIGRAM(S): at 15:51

## 2024-04-23 RX ADMIN — Medication 975 MILLIGRAM(S): at 20:55

## 2024-04-23 RX ADMIN — Medication 30 MILLIGRAM(S): at 12:45

## 2024-04-23 RX ADMIN — Medication 30 MILLIGRAM(S): at 19:14

## 2024-04-23 RX ADMIN — Medication 15 MILLILITER(S): at 00:50

## 2024-04-23 RX ADMIN — Medication 30 MILLIGRAM(S): at 12:15

## 2024-04-23 NOTE — OB RN PATIENT PROFILE - FALL HARM RISK - UNIVERSAL INTERVENTIONS
Bed in lowest position, wheels locked, appropriate side rails in place/Call bell, personal items and telephone in reach/Instruct patient to call for assistance before getting out of bed or chair/Non-slip footwear when patient is out of bed/[place] to call system/Physically safe environment - no spills, clutter or unnecessary equipment/Purposeful Proactive Rounding/Room/bathroom lighting operational, light cord in reach

## 2024-04-23 NOTE — DISCHARGE NOTE OB - HOSPITAL COURSE
Patient had a repeat LTCS after presented with PROM, meconium. CAN x3.  Multiple endometriosis implants bilateral ovaries, anterior uterus. Retrograde filling of bladder, intact. QBL 1209.

## 2024-04-23 NOTE — DISCHARGE NOTE OB - NS MD DC FALL RISK RISK
For information on Fall & Injury Prevention, visit: [email or web address] OR  [email or web address] OR  https://www.cdc.gov/steadi/patient.html

## 2024-04-23 NOTE — OB PROVIDER H&P - NSHPPHYSICALEXAM_GEN_ALL_CORE
T(C): 36.8 (04-23-24 @ 00:12), Max: 36.8 (04-22-24 @ 23:45)  HR: 86 (04-23-24 @ 00:15) (84 - 98)  BP: 128/81 (04-23-24 @ 00:12) (128/81 - 139/87)  RR: 18 (04-23-24 @ 00:02) (18 - 18)  SpO2: 96% (04-23-24 @ 00:15) (91% - 97%)    Gen: NAD  CV: clinically well perfused  Pulm: unlabored respirations  Abd: soft, NT, ND, gravid, no rebound or guarding  SVE: 0/0/-3  SSE: +pooling, nitrazine positive, meconium-stained fluid  FHT: 140, mod alireza, + accels, - decels  TOCO: q3-6m  Sono: cephalic

## 2024-04-23 NOTE — OB PROVIDER H&P - HISTORY OF PRESENT ILLNESS
38yo  @39w p/w LOF since 1030p. States originally clear fluid, but now appears more 'yellow/green.' –VB, -Ctx, +FM. Denies fever, chills, nausea, vomiting, diarrhea, headache, constipation, dizziness, syncope, chest pain, palpitations, shortness of breath, dysuria, urgency, frequency. Last ate at 6pm.  PNC: [facility]  GBS: neg  EFW: 3100  ObHx:  - FT C/S for breech presentation - 7#1 ()  GynHx: endometriosis, h/o LSC R ov cystectomy  MedHx: h/o childhood epilepsy - no seizures since childhood, not on meds  PSHx: C/S (), LSC R ov cystectomy (2017)  PsychHx: denies  SocialHx: denies  AllergyHx: latex sensitivity (rash)  RxHx: PNV, po iron

## 2024-04-23 NOTE — OB RN PATIENT PROFILE - NSSDOHTHREATEN_OBGYN_A_OB
never Melolabial Transposition Flap Text: The defect edges were debeveled with a #15 scalpel blade.  Given the location of the defect and the proximity to free margins a melolabial flap was deemed most appropriate.  Using a sterile surgical marker, an appropriate melolabial transposition flap was drawn incorporating the defect.    The area thus outlined was incised deep to adipose tissue with a #15 scalpel blade.  The skin margins were undermined to an appropriate distance in all directions utilizing iris scissors.

## 2024-04-23 NOTE — OB RN DELIVERY SUMMARY - NSSELHIDDEN_OBGYN_ALL_OB_FT
[NS_DeliveryAttending1_OBGYN_ALL_OB_FT:[id number]],[NS_DeliveryAssist1_OBGYN_ALL_OB_FT:[id number]=],[NS_DeliveryRN_OBGYN_ALL_OB_FT:[id number]=]

## 2024-04-23 NOTE — OB PROVIDER H&P - ASSESSMENT
40yo  @39w presenting with PROM@1030p   -admit to L&D  - GBS neg  - EFW 3100  - Reglan/Pepcid/Bicitra  - Routine Labs  - FHT/TOCO  - Anesthesia Consult  - for rLTCS    D/w Dr. Nichols

## 2024-04-23 NOTE — OB RN INTRAOPERATIVE NOTE - NSSELHIDDEN_OBGYN_ALL_OB_FT
[NS_DeliveryAttending1_OBGYN_ALL_OB_FT:[id number]],[NS_DeliveryAssist1_OBGYN_ALL_OB_FT:[id number]=],[NS_DeliveryRN_OBGYN_ALL_OB_FT:[id number]]

## 2024-04-23 NOTE — PRE-ANESTHESIA EVALUATION ADULT - NSANTHPMHFT_GEN_ALL_CORE
IUP; prior C section 2021; right ovarian cystectomy 2017; endometriosis epilepsy as a child with last one around age 11 yrs--off anticonvulsants since childhood; anemia on oral iron;

## 2024-04-23 NOTE — PRE-ANESTHESIA EVALUATION ADULT - TEMPERATURE IN CELSIUS (DEGREES C)
Advance Care Planning     Advance Care Planning (ACP) Physician      Date of Conversation: 10/25/2022          Conversation Conducted with:   Patient with Decision Making Capacity, stating that the Other Legally Authorized Decision Maker would be Spouse as SDM,   Patient is on presence of no family member,, stated that the pt wants to be not DNR at this time,  The pt likes to be a full code individual,  The patient states that there is a lot of will to live,      Conversation Outcomes / Follow-Up Plan:   Completed new Advance Directive      Length of ACP Conversation in minutes:  12 minutes          Meli Helms MD 36.8

## 2024-04-23 NOTE — OB PROVIDER DELIVERY SUMMARY - NSPROVIDERDELIVERYNOTE_OBGYN_ALL_OB_FT
Unscheduled rLTCS at 39w i/s/o PROM. Light meconium noted upon hysterotomy. Several adhesions from bladder to anterior surface of uterus carefully taken down.   Viable female infant, apgars 9/9, weight 3100g, cephalic presentation  Hysterotomy closed in 1 layer using PDS. Additional figure of 8 stitches placed along the hysterotomy for hemostasis.  1g IV TXA and additional Pitocin administered with good tone of uterus appreciated  Bladder backfilled with 250cc methylene blue and bladder noted to be intact with no extravasation of fluid  Surgicel and Interceed placed over hysterotomy   Endometriotic implants noted along uterine serosa and b/l ovaries (R>L). Otherwise grossly normal uterus, tubes, and ovaries  Abdomen closed in standard fashion  Pt and infant to recovery in stable condition  Placenta to pathology  QBL: 1209   IVF: 2100    UOP: 900    Dictation #[id number]    Delivery with Dr. Simone Coppola, PGY-2 Unscheduled rLTCS at 39w after PROM. Light meconium noted upon hysterotomy. Several adhesions from bladder to anterior surface of uterus carefully taken down.   Viable female infant, apgars 9/9, weight 3100g, cephalic presentation  Hysterotomy closed in 1 layer using PDS. Additional figure of 8 stitches placed along the hysterotomy for hemostasis.  1g IV TXA and additional Pitocin administered with good tone of uterus appreciated  Bladder backfilled with 250cc methylene blue and bladder noted to be intact with no extravasation of fluid  Surgicel and Interceed placed over hysterotomy   Endometriotic implants noted along uterine serosa and b/l ovaries (R>L). Otherwise grossly normal uterus, tubes, and ovaries  Abdomen closed in standard fashion  Pt and infant to recovery in stable condition  Placenta to pathology  QBL: 1209   IVF: 2100    UOP: 900    Dictation #[id number]    Delivery with Dr. Simone Coppola, PGY-2

## 2024-04-23 NOTE — OB NEONATOLOGY/PEDIATRICIAN DELIVERY SUMMARY - NSPEDSNEONOTESA_OBGYN_ALL_OB_FT
L&D nurse reports this as a 39wk AGA female born on [birth year] at 0201 via repeat unscheduled CS to a 37 y/o  blood type O+ mother.  Maternal history of endometriosis, cystectomy, epilepsy (resolved).  No significant prenatal history.  PNL HIV -/Hep B-/RPR non-reactive/Rubella immune, GBS - on 24.  SROM on 24 at 2240 with meconium fluids.  Baby was warmed/ dried/ suctioned/ stimulated with APGARS of 9/9; had NC x3.  Mom plans to initiate breastfeeding & formula feeding, consents to Hep B vaccine.  Highest maternal temp 36.8C with EOS of 0.07.  PMD is Dr. Adilia Nunez, admitted under Dr. Kent. Nursery ACP called to attend delivery due to MSAF for this 39wk AGA female born on 24 at 0201 via repeat unscheduled CS to a 37 y/o  blood type O+ mother.  Maternal history of endometriosis, cystectomy, epilepsy (resolved).  No significant prenatal history.  PNL HIV -/Hep B-/RPR non-reactive/Rubella immune, GBS - on 24.  SROM on 24 at 2240 with meconium fluids.  Baby was warmed/ dried/ suctioned/ stimulated with APGARS of 9/9; had NC x3.  Mom plans to initiate breastfeeding & formula feeding, consents to Hep B vaccine.  Highest maternal temp 36.8C with EOS of 0.07.  PMD is Dr. Adilia Nunez, admitted under Dr. Kent.

## 2024-04-23 NOTE — DISCHARGE NOTE OB - CARE PROVIDER_API CALL
Nicki Nichols  Obstetrics and Gynecology  [address], [address]  [place] [address]  Phone: [phone]  Fax: [phone]  Follow Up Time: 2 weeks

## 2024-04-23 NOTE — OB PROVIDER H&P - ATTENDING COMMENTS
OB attending     Patient seen and consent signed   for repeat c/s   risk discussed bleeding, infection and damage to surrounding organs    Nicki Nichols MD

## 2024-04-23 NOTE — DISCHARGE NOTE OB - PATIENT PORTAL LINK FT
You can access the FollowMyHealth Patient Portal offered by [facility] by registering at the following website: [email or web address]. By joining [employer]’s FollowMyHealth portal, you will also be able to view your health information using other applications (apps) compatible with our system.

## 2024-04-23 NOTE — OB PROVIDER DELIVERY SUMMARY - NSSELHIDDEN_OBGYN_ALL_OB_FT
[[email or web address]_OBGYN_ALL_OB_FT:[id number]],[NS_DeliveryAssist1_OBGYN_ALL_OB_FT:[id number]],[NS_DeliveryRN_OBGYN_ALL_OB_FT:[id number]]

## 2024-04-24 LAB
BASOPHILS # BLD AUTO: 0.01 K/UL — SIGNIFICANT CHANGE UP (ref 0–0.2)
BASOPHILS NFR BLD AUTO: 0.1 % — SIGNIFICANT CHANGE UP (ref 0–2)
EOSINOPHIL # BLD AUTO: 0.09 K/UL — SIGNIFICANT CHANGE UP (ref 0–0.5)
EOSINOPHIL NFR BLD AUTO: 1 % — SIGNIFICANT CHANGE UP (ref 0–6)
HCT VFR BLD CALC: 29 % — LOW (ref 34.5–45)
HGB BLD-MCNC: 9.3 G/DL — LOW (ref 11.5–15.5)
IMM GRANULOCYTES NFR BLD AUTO: 0.5 % — SIGNIFICANT CHANGE UP (ref 0–0.9)
LYMPHOCYTES # BLD AUTO: 2.16 K/UL — SIGNIFICANT CHANGE UP (ref 1–3.3)
LYMPHOCYTES # BLD AUTO: 22.8 % — SIGNIFICANT CHANGE UP (ref 13–44)
MCHC RBC-ENTMCNC: 29.2 PG — SIGNIFICANT CHANGE UP (ref 27–34)
MCHC RBC-ENTMCNC: 32.1 GM/DL — SIGNIFICANT CHANGE UP (ref 32–36)
MCV RBC AUTO: 91.2 FL — SIGNIFICANT CHANGE UP (ref 80–100)
MONOCYTES # BLD AUTO: 0.58 K/UL — SIGNIFICANT CHANGE UP (ref 0–0.9)
MONOCYTES NFR BLD AUTO: 6.1 % — SIGNIFICANT CHANGE UP (ref 2–14)
NEUTROPHILS # BLD AUTO: 6.58 K/UL — SIGNIFICANT CHANGE UP (ref 1.8–7.4)
NEUTROPHILS NFR BLD AUTO: 69.5 % — SIGNIFICANT CHANGE UP (ref 43–77)
NRBC # BLD: 0 /100 WBCS — SIGNIFICANT CHANGE UP (ref 0–0)
PLATELET # BLD AUTO: 140 K/UL — LOW (ref 150–400)
RBC # BLD: 3.18 M/UL — LOW (ref 3.8–5.2)
RBC # FLD: 14.9 % — HIGH (ref 10.3–14.5)
WBC # BLD: 9.47 K/UL — SIGNIFICANT CHANGE UP (ref 3.8–10.5)
WBC # FLD AUTO: 9.47 K/UL — SIGNIFICANT CHANGE UP (ref 3.8–10.5)

## 2024-04-24 RX ORDER — FERROUS SULFATE 325(65) MG
325 TABLET ORAL THREE TIMES A DAY
Refills: 0 | Status: DISCONTINUED | OUTPATIENT
Start: 2024-04-24 | End: 2024-04-25

## 2024-04-24 RX ORDER — OXYCODONE HYDROCHLORIDE 5 MG/1
5 TABLET ORAL ONCE
Refills: 0 | Status: DISCONTINUED | OUTPATIENT
Start: 2024-04-24 | End: 2024-04-24

## 2024-04-24 RX ORDER — OXYCODONE HYDROCHLORIDE 5 MG/1
5 TABLET ORAL
Refills: 0 | Status: DISCONTINUED | OUTPATIENT
Start: 2024-04-24 | End: 2024-04-25

## 2024-04-24 RX ORDER — ASCORBIC ACID 60 MG
500 TABLET,CHEWABLE ORAL DAILY
Refills: 0 | Status: DISCONTINUED | OUTPATIENT
Start: 2024-04-24 | End: 2024-04-25

## 2024-04-24 RX ORDER — IBUPROFEN 200 MG
600 TABLET ORAL EVERY 6 HOURS
Refills: 0 | Status: DISCONTINUED | OUTPATIENT
Start: 2024-04-24 | End: 2024-04-25

## 2024-04-24 RX ADMIN — Medication 325 MILLIGRAM(S): at 15:45

## 2024-04-24 RX ADMIN — OXYCODONE HYDROCHLORIDE 5 MILLIGRAM(S): 5 TABLET ORAL at 18:50

## 2024-04-24 RX ADMIN — Medication 30 MILLIGRAM(S): at 06:26

## 2024-04-24 RX ADMIN — Medication 600 MILLIGRAM(S): at 12:05

## 2024-04-24 RX ADMIN — Medication 975 MILLIGRAM(S): at 03:45

## 2024-04-24 RX ADMIN — Medication 975 MILLIGRAM(S): at 20:48

## 2024-04-24 RX ADMIN — Medication 325 MILLIGRAM(S): at 20:18

## 2024-04-24 RX ADMIN — Medication 600 MILLIGRAM(S): at 18:07

## 2024-04-24 RX ADMIN — Medication 975 MILLIGRAM(S): at 20:18

## 2024-04-24 RX ADMIN — Medication 600 MILLIGRAM(S): at 18:50

## 2024-04-24 RX ADMIN — HEPARIN SODIUM 5000 UNIT(S): 5000 INJECTION INTRAVENOUS; SUBCUTANEOUS at 18:07

## 2024-04-24 RX ADMIN — Medication 975 MILLIGRAM(S): at 02:43

## 2024-04-24 RX ADMIN — Medication 600 MILLIGRAM(S): at 12:35

## 2024-04-24 RX ADMIN — Medication 30 MILLIGRAM(S): at 01:20

## 2024-04-24 RX ADMIN — Medication 975 MILLIGRAM(S): at 16:16

## 2024-04-24 RX ADMIN — OXYCODONE HYDROCHLORIDE 5 MILLIGRAM(S): 5 TABLET ORAL at 18:20

## 2024-04-24 RX ADMIN — Medication 975 MILLIGRAM(S): at 15:46

## 2024-04-24 RX ADMIN — Medication 975 MILLIGRAM(S): at 09:00

## 2024-04-24 RX ADMIN — HEPARIN SODIUM 5000 UNIT(S): 5000 INJECTION INTRAVENOUS; SUBCUTANEOUS at 06:26

## 2024-04-24 RX ADMIN — Medication 975 MILLIGRAM(S): at 09:30

## 2024-04-24 RX ADMIN — Medication 30 MILLIGRAM(S): at 00:16

## 2024-04-24 RX ADMIN — Medication 500 MILLIGRAM(S): at 12:06

## 2024-04-24 NOTE — PROGRESS NOTE ADULT - ASSESSMENT
40y/o POD#1 from rLTCS QBL: 1209 secondary to atony s/p TXA and extra pitocin, Overall, patient stable and recovering well postoperatively.    #PPH  - Follow up AM CBC   - Patient stable  - s/p TXA and extra pitocin     #Postpartum state  - Continue with po analgesia  - Increase ambulation  - Continue regular diet  - DVT prophylaxis with 5000u Heparin    Marianne Bowman  PGY-1

## 2024-04-24 NOTE — PROGRESS NOTE ADULT - ATTENDING COMMENTS
40 yo POD#1 s/p RCS c/b PPH (QBL 1209cc) s/p TXA and extra pit. VSS. H/H appropriate. Continue routine post-op care.

## 2024-04-25 VITALS
DIASTOLIC BLOOD PRESSURE: 78 MMHG | HEART RATE: 71 BPM | OXYGEN SATURATION: 98 % | TEMPERATURE: 98 F | RESPIRATION RATE: 18 BRPM | SYSTOLIC BLOOD PRESSURE: 117 MMHG

## 2024-04-25 PROCEDURE — 86901 BLOOD TYPING SEROLOGIC RH(D): CPT

## 2024-04-25 PROCEDURE — 86850 RBC ANTIBODY SCREEN: CPT

## 2024-04-25 PROCEDURE — 59025 FETAL NON-STRESS TEST: CPT

## 2024-04-25 PROCEDURE — 36415 COLL VENOUS BLD VENIPUNCTURE: CPT

## 2024-04-25 PROCEDURE — 88307 TISSUE EXAM BY PATHOLOGIST: CPT

## 2024-04-25 PROCEDURE — 85025 COMPLETE CBC W/AUTO DIFF WBC: CPT

## 2024-04-25 PROCEDURE — 86780 TREPONEMA PALLIDUM: CPT

## 2024-04-25 PROCEDURE — C1889: CPT

## 2024-04-25 PROCEDURE — C1765: CPT

## 2024-04-25 PROCEDURE — 86900 BLOOD TYPING SEROLOGIC ABO: CPT

## 2024-04-25 PROCEDURE — 59050 FETAL MONITOR W/REPORT: CPT

## 2024-04-25 RX ORDER — FERROUS SULFATE 325(65) MG
1 TABLET ORAL
Refills: 0 | DISCHARGE

## 2024-04-25 RX ORDER — ASCORBIC ACID 60 MG
1 TABLET,CHEWABLE ORAL
Qty: 0 | Refills: 0 | DISCHARGE
Start: 2024-04-25

## 2024-04-25 RX ORDER — OXYCODONE HYDROCHLORIDE 5 MG/1
1 TABLET ORAL
Qty: 10 | Refills: 0
Start: 2024-04-25

## 2024-04-25 RX ORDER — ACETAMINOPHEN 500 MG
3 TABLET ORAL
Qty: 0 | Refills: 0 | DISCHARGE
Start: 2024-04-25

## 2024-04-25 RX ORDER — IBUPROFEN 200 MG
1 TABLET ORAL
Qty: 0 | Refills: 0 | DISCHARGE
Start: 2024-04-25

## 2024-04-25 RX ORDER — FERROUS SULFATE 325(65) MG
1 TABLET ORAL
Qty: 0 | Refills: 0 | DISCHARGE
Start: 2024-04-25

## 2024-04-25 RX ADMIN — OXYCODONE HYDROCHLORIDE 5 MILLIGRAM(S): 5 TABLET ORAL at 05:45

## 2024-04-25 RX ADMIN — Medication 600 MILLIGRAM(S): at 01:14

## 2024-04-25 RX ADMIN — Medication 600 MILLIGRAM(S): at 05:45

## 2024-04-25 RX ADMIN — Medication 325 MILLIGRAM(S): at 05:45

## 2024-04-25 RX ADMIN — Medication 600 MILLIGRAM(S): at 12:21

## 2024-04-25 RX ADMIN — OXYCODONE HYDROCHLORIDE 5 MILLIGRAM(S): 5 TABLET ORAL at 00:44

## 2024-04-25 RX ADMIN — OXYCODONE HYDROCHLORIDE 5 MILLIGRAM(S): 5 TABLET ORAL at 12:53

## 2024-04-25 RX ADMIN — Medication 600 MILLIGRAM(S): at 00:44

## 2024-04-25 RX ADMIN — Medication 975 MILLIGRAM(S): at 09:34

## 2024-04-25 RX ADMIN — Medication 500 MILLIGRAM(S): at 12:21

## 2024-04-25 RX ADMIN — HEPARIN SODIUM 5000 UNIT(S): 5000 INJECTION INTRAVENOUS; SUBCUTANEOUS at 05:45

## 2024-04-25 NOTE — PROGRESS NOTE ADULT - SUBJECTIVE AND OBJECTIVE BOX
Day 0 of Anesthesia Pain Management Service    SUBJECTIVE: Doing ok  Pain Scale Score:          [X] Refer to charted pain scores    THERAPY:    s/p   100  mcg PF morphine on 4\23\2024       MEDICATIONS  (STANDING):  acetaminophen     Tablet .. 975 milliGRAM(s) Oral <User Schedule>  diphtheria/tetanus/pertussis (acellular) Vaccine (Adacel) 0.5 milliLiter(s) IntraMuscular once  heparin   Injectable 5000 Unit(s) SubCutaneous every 12 hours  ibuprofen  Tablet. 600 milliGRAM(s) Oral every 6 hours  influenza   Vaccine 0.5 milliLiter(s) IntraMuscular once  ketorolac   Injectable 30 milliGRAM(s) IV Push every 6 hours  lactated ringers. 1000 milliLiter(s) (125 mL/Hr) IV Continuous <Continuous>  lactated ringers. 1000 milliLiter(s) (200 mL/Hr) IV Continuous <Continuous>  morphine PF Spinal 0.1 milliGRAM(s) IntraThecal. once  oxytocin Infusion 333.333 milliUNIT(s)/Min (1000 mL/Hr) IV Continuous <Continuous>  oxytocin Infusion 333.333 milliUNIT(s)/Min (1000 mL/Hr) IV Continuous <Continuous>    MEDICATIONS  (PRN):  dexAMETHasone  Injectable 4 milliGRAM(s) IV Push every 6 hours PRN Nausea  diphenhydrAMINE 25 milliGRAM(s) Oral every 6 hours PRN Pruritus  lanolin Ointment 1 Application(s) Topical every 6 hours PRN Sore Nipples  magnesium hydroxide Suspension 30 milliLiter(s) Oral two times a day PRN Constipation  nalbuphine Injectable 2.5 milliGRAM(s) IV Push every 6 hours PRN Pruritus  naloxone Injectable 0.1 milliGRAM(s) IV Push every 3 minutes PRN For ANY of the following changes in patient status:  A. Breaths Per Minute LESS THAN 10, B. Oxygen saturation LESS THAN 90%, C. Sedation score of 6 for Stop After: 4 Times  ondansetron Injectable 4 milliGRAM(s) IV Push every 6 hours PRN Nausea  oxyCODONE    IR 5 milliGRAM(s) Oral every 3 hours PRN Moderate to Severe Pain (4-10)  oxyCODONE    IR 5 milliGRAM(s) Oral once PRN Moderate to Severe Pain (4-10)  oxyCODONE    IR 5 milliGRAM(s) Oral every 3 hours PRN Mild Pain (1 - 3)  simethicone 80 milliGRAM(s) Chew every 4 hours PRN Gas      OBJECTIVE:    Sedation:        	[X] Alert	 [ ] Drowsy	[ ] Arousable      [ ] Asleep       [ ] Unresponsive    Side Effects:	[X] None 	[ ] Nausea	[ ] Vomiting         [ ] Pruritus  		[ ] Weakness            [ ] Numbness	          [ ] Other:    Vital Signs Last 24 Hrs  T(C): 36.9 (23 Apr 2024 10:00), Max: 36.9 (23 Apr 2024 10:00)  T(F): 98.5 (23 Apr 2024 10:00), Max: 98.5 (23 Apr 2024 10:00)  HR: 74 (23 Apr 2024 06:30) (73 - 98)  BP: 98/62 (23 Apr 2024 06:30) (90/48 - 139/87)  BP(mean): 81 (23 Apr 2024 05:40) (65 - 83)  RR: 18 (23 Apr 2024 10:00) (17 - 18)  SpO2: 96% (23 Apr 2024 10:00) (91% - 97%)    Parameters below as of 23 Apr 2024 10:00  Patient On (Oxygen Delivery Method): room air        ASSESSMENT/ PLAN  [X] Patient to be transitioned to prn analgesics after 24 hours  [X] Pain management per primary service, pain service to sign off   [X]Documentation and Verification of current medications
R1 POD#2 rLTCS Progress Note    Patient seen and examined at bedside, no acute overnight events. No acute complaints, pain well controlled. Patient is ambulating and tolerating regular diet. Has passed flatus. Patient voiding independently. Denies CP, SOB, N/V, HA, blurred vision, epigastric pain. Bleeding minimal.    Vital Signs Last 24 Hours  T(C): 36.8 (04-24-24 @ 21:41), Max: 36.9 (04-24-24 @ 05:34)  HR: 76 (04-24-24 @ 21:41) (76 - 96)  BP: 127/87 (04-24-24 @ 21:41) (106/71 - 127/87)  RR: 18 (04-24-24 @ 21:41) (18 - 18)  SpO2: 95% (04-24-24 @ 21:41) (95% - 99%)    I&O's Summary    23 Apr 2024 07:01  -  24 Apr 2024 07:00  --------------------------------------------------------  IN: 0 mL / OUT: 750 mL / NET: -750 mL        Physical Exam:  General: NAD  Abdomen: Soft, non-tender, non-distended, fundus firm  Incision: Pfannenstiel incision CDI, subcuticular suture closure  Pelvic: Lochia wnl    Labs:    Blood Type: O Positive  Antibody Screen: Negative  RPR: Negative               9.3    9.47  )-----------( 140      ( 04-24 @ 07:55 )             29.0                11.7   7.42  )-----------( 164      ( 04-23 @ 00:40 )             35.9                11.2   7.75  )-----------( 183      ( 04-10 @ 12:35 )             35.1         MEDICATIONS  (STANDING):  acetaminophen     Tablet .. 975 milliGRAM(s) Oral <User Schedule>  ascorbic acid 500 milliGRAM(s) Oral daily  diphtheria/tetanus/pertussis (acellular) Vaccine (Adacel) 0.5 milliLiter(s) IntraMuscular once  ferrous    sulfate 325 milliGRAM(s) Oral three times a day  heparin   Injectable 5000 Unit(s) SubCutaneous every 12 hours  ibuprofen  Tablet. 600 milliGRAM(s) Oral every 6 hours  influenza   Vaccine 0.5 milliLiter(s) IntraMuscular once  lactated ringers. 1000 milliLiter(s) (200 mL/Hr) IV Continuous <Continuous>  lactated ringers. 1000 milliLiter(s) (125 mL/Hr) IV Continuous <Continuous>  oxytocin Infusion 333.333 milliUNIT(s)/Min (1000 mL/Hr) IV Continuous <Continuous>  oxytocin Infusion 333.333 milliUNIT(s)/Min (1000 mL/Hr) IV Continuous <Continuous>    MEDICATIONS  (PRN):  dexAMETHasone  Injectable 4 milliGRAM(s) IV Push every 6 hours PRN Nausea  diphenhydrAMINE 25 milliGRAM(s) Oral every 6 hours PRN Pruritus  lanolin Ointment 1 Application(s) Topical every 6 hours PRN Sore Nipples  magnesium hydroxide Suspension 30 milliLiter(s) Oral two times a day PRN Constipation  nalbuphine Injectable 2.5 milliGRAM(s) IV Push every 6 hours PRN Pruritus  naloxone Injectable 0.1 milliGRAM(s) IV Push every 3 minutes PRN For ANY of the following changes in patient status:  A. Breaths Per Minute LESS THAN 10, B. Oxygen saturation LESS THAN 90%, C. Sedation score of 6 for Stop After: 4 Times  ondansetron Injectable 4 milliGRAM(s) IV Push every 6 hours PRN Nausea  oxyCODONE    IR 5 milliGRAM(s) Oral once PRN Moderate to Severe Pain (4-10)  oxyCODONE    IR 5 milliGRAM(s) Oral every 3 hours PRN Moderate to Severe Pain (4-10)  simethicone 80 milliGRAM(s) Chew every 4 hours PRN Gas  
Day 1 of Anesthesia Pain Management Service    SUBJECTIVE:  Pain Scale Score:          [X] Refer to charted pain scores    THERAPY: Received PF spinal morphine as above    OBJECTIVE:    Sedation:        	[X] Alert	[ ] Drowsy	[ ] Arousable      [ ] Asleep       [ ] Unresponsive    Side Effects:	[X] None	[ ] Nausea	[ ] Vomiting         [ ] Pruritus  		[ ] Weakness            [ ] Numbness	          [ ] Other:    ASSESSMENT/ PLAN  [X] Patient transitioned to prn analgesics  [X] Pain management per primary service, pain service to sign off   [X]Documentation and Verification of current medications
Postpartum Note,  Section   ATTENDING NOTE Post-operative day 2    Subjective:  The patient feels well.  She is ambulating.   She is tolerating regular diet.  She denies nausea and vomiting.  She is voiding.  Her pain is controlled.  She reports normal postpartum bleeding    Physical exam:    Vital Signs Last 24 Hrs  T(C): 36.6 (2024 05:25), Max: 36.9 (2024 17:08)  T(F): 97.9 (2024 05:25), Max: 98.5 (2024 17:08)  HR: 71 (2024 05:25) (71 - 96)  BP: 117/78 (2024 05:25) (106/71 - 127/87)  BP(mean): --  RR: 18 (2024 05:25) (18 - 18)  SpO2: 98% (2024 05:25) (95% - 98%)    Parameters below as of 2024 05:25  Patient On (Oxygen Delivery Method): room air        Gen: NAD  Breast: Soft, nontender, not engorged.  Abdomen: Soft, nontender, no distension , firm uterine fundus at umbilicus.  Incision: Clean, dry, and intact  Pelvic: Normal lochia noted  Ext: No calf tenderness    LABS:                        9.3    9.47  )-----------( 140      ( 2024 07:55 )             29.0     ABO Interpretation: O (24 @ 00:23)  Rh Interpretation: Positive (24 @ 00:23)    Antibody ScreenNegative                Allergies    latex (Unknown)  No Known Drug Allergies    Intolerances      MEDICATIONS  (STANDING):  acetaminophen     Tablet .. 975 milliGRAM(s) Oral <User Schedule>  ascorbic acid 500 milliGRAM(s) Oral daily  diphtheria/tetanus/pertussis (acellular) Vaccine (Adacel) 0.5 milliLiter(s) IntraMuscular once  ferrous    sulfate 325 milliGRAM(s) Oral three times a day  heparin   Injectable 5000 Unit(s) SubCutaneous every 12 hours  ibuprofen  Tablet. 600 milliGRAM(s) Oral every 6 hours  influenza   Vaccine 0.5 milliLiter(s) IntraMuscular once  lactated ringers. 1000 milliLiter(s) (200 mL/Hr) IV Continuous <Continuous>  lactated ringers. 1000 milliLiter(s) (125 mL/Hr) IV Continuous <Continuous>  oxytocin Infusion 333.333 milliUNIT(s)/Min (1000 mL/Hr) IV Continuous <Continuous>  oxytocin Infusion 333.333 milliUNIT(s)/Min (1000 mL/Hr) IV Continuous <Continuous>    MEDICATIONS  (PRN):  dexAMETHasone  Injectable 4 milliGRAM(s) IV Push every 6 hours PRN Nausea  diphenhydrAMINE 25 milliGRAM(s) Oral every 6 hours PRN Pruritus  lanolin Ointment 1 Application(s) Topical every 6 hours PRN Sore Nipples  magnesium hydroxide Suspension 30 milliLiter(s) Oral two times a day PRN Constipation  nalbuphine Injectable 2.5 milliGRAM(s) IV Push every 6 hours PRN Pruritus  naloxone Injectable 0.1 milliGRAM(s) IV Push every 3 minutes PRN For ANY of the following changes in patient status:  A. Breaths Per Minute LESS THAN 10, B. Oxygen saturation LESS THAN 90%, C. Sedation score of 6 for Stop After: 4 Times  ondansetron Injectable 4 milliGRAM(s) IV Push every 6 hours PRN Nausea  oxyCODONE    IR 5 milliGRAM(s) Oral once PRN Moderate to Severe Pain (4-10)  oxyCODONE    IR 5 milliGRAM(s) Oral every 3 hours PRN Moderate to Severe Pain (4-10)  simethicone 80 milliGRAM(s) Chew every 4 hours PRN Gas        Assessment and Plan  POD # 2  s/p  section. Stable.  Encourage ambulation  Analgesia prn  Regular diet as tolerated      Office [phone]  Dr. Pedroza                
R1 POD#1 LTCS Progress Note    Patient seen and examined at bedside, no acute overnight events. No acute complaints, pain well controlled. Patient is ambulating and tolerating regular diet. Has passed flatus. Patient voiding independently. Denies CP, SOB, N/V, HA, blurred vision, epigastric pain. Bleeding minimal.    Vital Signs Last 24 Hours  T(C): 36.7 (04-23-24 @ 20:46), Max: 36.9 (04-23-24 @ 10:00)  HR: 78 (04-23-24 @ 20:46) (74 - 94)  BP: 101/68 (04-23-24 @ 20:46) (90/48 - 110/65)  RR: 18 (04-23-24 @ 20:46) (18 - 18)  SpO2: 96% (04-23-24 @ 20:46) (94% - 97%)    I&O's Summary    22 Apr 2024 07:01  -  23 Apr 2024 07:00  --------------------------------------------------------  IN: 2000 mL / OUT: 2309 mL / NET: -309 mL    23 Apr 2024 07:01  -  24 Apr 2024 04:12  --------------------------------------------------------  IN: 0 mL / OUT: 750 mL / NET: -750 mL        Physical Exam:  General: NAD  Abdomen: Soft, non-tender, non-distended, fundus firm  Incision: Pfannenstiel incision CDI, subcuticular suture closure  Pelvic: Lochia wnl    Labs:    Blood Type: O Positive  Antibody Screen: Negative  RPR: Negative               11.7   7.42  )-----------( 164      ( 04-23 @ 00:40 )             35.9                11.2   7.75  )-----------( 183      ( 04-10 @ 12:35 )             35.1         MEDICATIONS  (STANDING):  acetaminophen     Tablet .. 975 milliGRAM(s) Oral <User Schedule>  diphtheria/tetanus/pertussis (acellular) Vaccine (Adacel) 0.5 milliLiter(s) IntraMuscular once  heparin   Injectable 5000 Unit(s) SubCutaneous every 12 hours  ibuprofen  Tablet. 600 milliGRAM(s) Oral every 6 hours  influenza   Vaccine 0.5 milliLiter(s) IntraMuscular once  ketorolac   Injectable 30 milliGRAM(s) IV Push every 6 hours  lactated ringers. 1000 milliLiter(s) (125 mL/Hr) IV Continuous <Continuous>  lactated ringers. 1000 milliLiter(s) (200 mL/Hr) IV Continuous <Continuous>  oxytocin Infusion 333.333 milliUNIT(s)/Min (1000 mL/Hr) IV Continuous <Continuous>  oxytocin Infusion 333.333 milliUNIT(s)/Min (1000 mL/Hr) IV Continuous <Continuous>    MEDICATIONS  (PRN):  dexAMETHasone  Injectable 4 milliGRAM(s) IV Push every 6 hours PRN Nausea  diphenhydrAMINE 25 milliGRAM(s) Oral every 6 hours PRN Pruritus  lanolin Ointment 1 Application(s) Topical every 6 hours PRN Sore Nipples  magnesium hydroxide Suspension 30 milliLiter(s) Oral two times a day PRN Constipation  nalbuphine Injectable 2.5 milliGRAM(s) IV Push every 6 hours PRN Pruritus  naloxone Injectable 0.1 milliGRAM(s) IV Push every 3 minutes PRN For ANY of the following changes in patient status:  A. Breaths Per Minute LESS THAN 10, B. Oxygen saturation LESS THAN 90%, C. Sedation score of 6 for Stop After: 4 Times  ondansetron Injectable 4 milliGRAM(s) IV Push every 6 hours PRN Nausea  oxyCODONE    IR 5 milliGRAM(s) Oral once PRN Moderate to Severe Pain (4-10)  oxyCODONE    IR 5 milliGRAM(s) Oral every 3 hours PRN Moderate to Severe Pain (4-10)  simethicone 80 milliGRAM(s) Chew every 4 hours PRN Gas

## 2024-04-25 NOTE — PROGRESS NOTE ADULT - ASSESSMENT
38y/o POD#2 from rLTCS QBL: 1209 secondary to atony s/p TXA and extra pitocin, Overall, patient stable and recovering well postoperatively.    #PPH  - Hct: 35.9->29.0  - Patient stable  - s/p TXA and extra pitocin     #Postpartum state  - Continue with po analgesia  - Increase ambulation  - Continue regular diet  - DVT prophylaxis with 5000u Heparin    Marianne Bowman  PGY-1

## 2024-05-09 LAB — SURGICAL PATHOLOGY STUDY: SIGNIFICANT CHANGE UP

## 2024-05-24 ENCOUNTER — EMERGENCY (EMERGENCY)
Facility: HOSPITAL | Age: 39
LOS: 1 days | Discharge: HOME | End: 2024-05-24
Attending: STUDENT IN AN ORGANIZED HEALTH CARE EDUCATION/TRAINING PROGRAM | Admitting: STUDENT IN AN ORGANIZED HEALTH CARE EDUCATION/TRAINING PROGRAM
Payer: COMMERCIAL

## 2024-05-24 VITALS
TEMPERATURE: 98 F | HEART RATE: 72 BPM | WEIGHT: 154.98 LBS | RESPIRATION RATE: 16 BRPM | SYSTOLIC BLOOD PRESSURE: 133 MMHG | OXYGEN SATURATION: 99 % | HEIGHT: 63 IN | DIASTOLIC BLOOD PRESSURE: 92 MMHG

## 2024-05-24 DIAGNOSIS — Z98.890 OTHER SPECIFIED POSTPROCEDURAL STATES: Chronic | ICD-10-CM

## 2024-05-24 DIAGNOSIS — Z98.891 HISTORY OF UTERINE SCAR FROM PREVIOUS SURGERY: Chronic | ICD-10-CM

## 2024-05-24 LAB
APPEARANCE UR: CLEAR — SIGNIFICANT CHANGE UP
BILIRUB UR-MCNC: NEGATIVE — SIGNIFICANT CHANGE UP
COLOR SPEC: YELLOW — SIGNIFICANT CHANGE UP
DIFF PNL FLD: ABNORMAL
GLUCOSE UR QL: NEGATIVE MG/DL — SIGNIFICANT CHANGE UP
KETONES UR-MCNC: ABNORMAL MG/DL
LEUKOCYTE ESTERASE UR-ACNC: ABNORMAL
NITRITE UR-MCNC: NEGATIVE — SIGNIFICANT CHANGE UP
PH UR: 6 — SIGNIFICANT CHANGE UP (ref 5–8)
PROT UR-MCNC: NEGATIVE MG/DL — SIGNIFICANT CHANGE UP
SP GR SPEC: 1.02 — SIGNIFICANT CHANGE UP (ref 1–1.03)
UROBILINOGEN FLD QL: 0.2 MG/DL — SIGNIFICANT CHANGE UP (ref 0.2–1)

## 2024-05-24 PROCEDURE — 99283 EMERGENCY DEPT VISIT LOW MDM: CPT

## 2024-05-24 PROCEDURE — 81001 URINALYSIS AUTO W/SCOPE: CPT

## 2024-05-24 PROCEDURE — 99284 EMERGENCY DEPT VISIT MOD MDM: CPT

## 2024-05-24 PROCEDURE — 87086 URINE CULTURE/COLONY COUNT: CPT

## 2024-05-24 RX ORDER — CEFUROXIME AXETIL 250 MG
500 TABLET ORAL ONCE
Refills: 0 | Status: COMPLETED | OUTPATIENT
Start: 2024-05-24 | End: 2024-05-24

## 2024-05-24 RX ORDER — ACETAMINOPHEN 500 MG
650 TABLET ORAL ONCE
Refills: 0 | Status: COMPLETED | OUTPATIENT
Start: 2024-05-24 | End: 2024-05-24

## 2024-05-24 RX ORDER — CEFUROXIME AXETIL 250 MG
1 TABLET ORAL
Qty: 14 | Refills: 0
Start: 2024-05-24 | End: 2024-05-30

## 2024-05-24 RX ADMIN — Medication 500 MILLIGRAM(S): at 22:01

## 2024-05-24 RX ADMIN — Medication 650 MILLIGRAM(S): at 20:57

## 2024-05-24 NOTE — ED PROVIDER NOTE - OBJECTIVE STATEMENT
39-year-old female 4 weeks post partum s/p  at [facility] presents to the ED with c/o mild suprapubic pain x today. Patient reports she has been lifting her 3-year-old toddler is not sure if she overdid it with heavy lifting.  She denies any fever or chills, oozing from her surgical site, urinary symptoms, nausea vomiting diarrhea or all other complaints.

## 2024-05-24 NOTE — ED PROVIDER NOTE - ATTENDING APP SHARED VISIT CONTRIBUTION OF CARE
This was a shared visit with GEOFFREY. I reviewed and verified the documentation and independently performed the documented MDM.

## 2024-05-24 NOTE — ED PROVIDER NOTE - PHYSICAL EXAMINATION
Gen: Well appearing in NAD.   Head: atraumatic  Heart: s1/s2, RRR  Lung: CTA b/l  Abd: soft, +minimal suprapubic TTP,  No palpable uterus. no rebound or guarding, NCVAT  Neuro: AAO x3, Patient ambulatory in the ED  Skin:  scar is well-appearing, no signs of infection  Psych: Alert and oriented

## 2024-05-24 NOTE — ED ADULT NURSE NOTE - OBJECTIVE STATEMENT
pt presents to the ED c/o abdominal pain. pt had a c section 4 weeks ago. denies, fever, chills, dysuria, urinary frequency, difficulty ambulating, sob, chest pain, difficulty breathing. aox4, MAEx4. medicated and tolerated well. urine collected and sent. pending dispo.

## 2024-05-24 NOTE — ED PROVIDER NOTE - PATIENT PORTAL LINK FT
You can access the FollowMyHealth Patient Portal offered by [facility] by registering at the following website: [email or web address]. By joining [employer]’s FollowMyHealth portal, you will also be able to view your health information using other applications (apps) compatible with our system.

## 2024-05-24 NOTE — ED ADULT TRIAGE NOTE - CHIEF COMPLAINT QUOTE
Patient came ambulatory to triage c/o lower abdominal pain x few hors. Patient states is 4 weeks post partum via C- section. Denies nausea/ vomiting/ fever/ chills. Patient came ambulatory to triage c/o lower abdominal pain x few hors. Patient states is 4 weeks post partum via C- section. Denies nausea/ vomiting/ fever/ chills, no vaginal bleeding. Patient came ambulatory to triage c/o lower abdominal pain x few hours. Patient states is 4 weeks post partum via C- section. Denies nausea/ vomiting/ fever/ chills, no vaginal bleeding.

## 2024-05-24 NOTE — ED ADULT NURSE NOTE - NSFALLUNIVINTERV_ED_ALL_ED
Bed/Stretcher in lowest position, wheels locked, appropriate side rails in place/Call bell, personal items and telephone in reach/Instruct patient to call for assistance before getting out of bed/chair/stretcher/Non-slip footwear applied when patient is off stretcher/[place] to call system/Physically safe environment - no spills, clutter or unnecessary equipment/Purposeful proactive rounding/Room/bathroom lighting operational, light cord in reach
Normal Screen- (No follow-up needed)

## 2024-05-24 NOTE — ED PROVIDER NOTE - CLINICAL SUMMARY MEDICAL DECISION MAKING FREE TEXT BOX
39-year-old female recent  here complaining of lower abdominal discomfort that started today.  Differential inclusive of UTI, muscle strain, given minimal tenderness and well-appearing surgical scar, doubt intra-abdominal pathology/complication from .  Bedside sono without fluid collection as well.  Will check urine and reevaluate.

## 2024-05-24 NOTE — ED PROVIDER NOTE - NSFOLLOWUPINSTRUCTIONS_ED_ALL_ED_FT
Follow up with your OBGYN within 2-3 days. Take the copy of your test results you were given in the emergency room for your doctor to review.     Please fill the prescription for the antibiotics and take as directed.  Please finish the entire course of medication as prescribed.  If you have any belly pain after the antibiotics, yogurt has been shown to help with this.  Do not use any alcohol or grapefruit juice with any antibiotics.    Stay hydrated    Return to the ER if your symptoms worsen or for any other medical emergencies  ***********************    Urinary Tract Infection    A urinary tract infection (UTI) is an infection of any part of the urinary tract, which includes the kidneys, ureters, bladder, and urethra. Risk factors include ignoring your need to urinate, wiping back to front if female, being an uncircumcised male, and having diabetes or a weak immune system. Symptoms include frequent urination, pain or burning with urination, foul smelling urine, cloudy urine, pain in the lower abdomen, blood in the urine, and fever. If you were prescribed an antibiotic medicine, take it as told by your health care provider. Do not stop taking the antibiotic even if you start to feel better.    SEEK IMMEDIATE MEDICAL CARE IF YOU HAVE ANY OF THE FOLLOWING SYMPTOMS: severe back or abdominal pain, fever, inability to keep fluids or medicine down, dizziness/lightheadedness, or a change in mental status.

## 2024-05-24 NOTE — ED ADULT NURSE NOTE - CHIEF COMPLAINT QUOTE
Patient came ambulatory to triage c/o lower abdominal pain x few hours. Patient states is 4 weeks post partum via C- section. Denies nausea/ vomiting/ fever/ chills, no vaginal bleeding.

## 2024-05-24 NOTE — ED PROVIDER NOTE - PROGRESS NOTE DETAILS
GWEN Costa: UA results reviewed patient has a UTI.  She is breast-feeding will DC with Ceftin, which is safe with lactation

## 2024-05-25 PROBLEM — G40.909 EPILEPSY, UNSPECIFIED, NOT INTRACTABLE, WITHOUT STATUS EPILEPTICUS: Chronic | Status: ACTIVE | Noted: 2024-04-23

## 2024-05-26 LAB
CULTURE RESULTS: SIGNIFICANT CHANGE UP
SPECIMEN SOURCE: SIGNIFICANT CHANGE UP

## 2024-12-12 ENCOUNTER — NON-APPOINTMENT (OUTPATIENT)
Age: 39
End: 2024-12-12

## 2024-12-23 NOTE — OB RN PATIENT PROFILE - NS_OBGYNHISTORY_OBGYN_ALL_OB_FT
Patient called stating she needs a referral sent to Dr Owen Dimas at [facility] in [place]. (Fax [phone])She is going to come in for a Pre-Op with Dr Ying (no one on your team had availability) on 12/30- she said that they need a referral too?? She was not very clear on details and wasn't sure of the name of clinic or their fax # -- she will bring in the paperwork to the appt with Dr ORO   c/s x1 (breech), cyst removal (2017)

## 2025-01-28 ENCOUNTER — NON-APPOINTMENT (OUTPATIENT)
Age: 40
End: 2025-01-28

## 2025-04-29 NOTE — OB PROVIDER TRIAGE NOTE - NS_VISITREASON1_OBGYN_ALL_OB
ENDOSCOPY DISCHARGE INSTRUCTIONS    Procedure Performed:   Colonoscopy    Endoscopist: Marie Rojo DO  FINDINGS:   Internal/external hemorrhoids and Colon polyp(s) (a growth in the colon)      Repeat next colonoscopy TBD after review of pathology    MEDICATIONS:  You may resume all other medications today    DIET:  Regular    BIOPSIES:  Biopsies were taken (you will be notified of results in 7-10 days)    X-RAYS:   No X-Rays were ordered today          Activity for remainder of today:    REST TODAY  DO NOT drive or operate heavy machinery  DO NOT drink any alcoholic beverages  DO NOT sign any legal documents or make any important decisions    After your procedure(s):  It is not unusual to feel bloated or gassy .  Passing gas and belching is encouraged. Lying on your left side with your knees flexed may relieve the discomfort. A hot pack to the abdomen may also help.    After your gastroscopy (upper endoscopy): You may experience a slight sore throat which will subside. Throat lozenges or salt water gargle can be used.    FOLLOW-UP:  Contact the office at [phone] for follow-up appointment is needed or if you develop any of the following:    Severe abdominal pain/discomfort     Excessive bleeding                     Black tarry stool    Difficulty breathing/swallowing      Persistent nausea/vomiting  Fever above 100 degrees or chills           Hypertensive Disorder